# Patient Record
Sex: FEMALE | Race: WHITE | Employment: OTHER | URBAN - METROPOLITAN AREA
[De-identification: names, ages, dates, MRNs, and addresses within clinical notes are randomized per-mention and may not be internally consistent; named-entity substitution may affect disease eponyms.]

---

## 2018-03-20 ENCOUNTER — HOSPITAL ENCOUNTER (OUTPATIENT)
Dept: CT IMAGING | Age: 68
Discharge: HOME | End: 2018-03-20
Admitting: INTERNAL MEDICINE

## 2018-03-20 DIAGNOSIS — N28.89 OTHER SPECIFIED DISORDERS OF KIDNEY AND URETER: ICD-10-CM

## 2018-03-20 DIAGNOSIS — N28.89 RENAL MASS: ICD-10-CM

## 2018-03-20 RX ORDER — TC 99M MEDRONATE 20 MG/10ML
25.6 INJECTION, POWDER, LYOPHILIZED, FOR SOLUTION INTRAVENOUS
Status: COMPLETED | OUTPATIENT
Start: 2018-03-20 | End: 2018-03-20

## 2018-03-20 RX ADMIN — TC 99M MEDRONATE 25.6 MILLICURIE: 20 INJECTION, POWDER, LYOPHILIZED, FOR SOLUTION INTRAVENOUS at 11:05

## 2018-03-29 ENCOUNTER — HOSPITAL ENCOUNTER (OUTPATIENT)
Dept: OTHER | Age: 68
Discharge: HOME | End: 2018-03-29
Attending: UROLOGY | Admitting: UROLOGY

## 2018-03-29 VITALS
DIASTOLIC BLOOD PRESSURE: 82 MMHG | RESPIRATION RATE: 16 BRPM | TEMPERATURE: 98.1 F | OXYGEN SATURATION: 91 % | SYSTOLIC BLOOD PRESSURE: 156 MMHG | HEART RATE: 107 BPM

## 2018-03-29 LAB
A/G RATIO: 0.9 (ref 1.1–2.2)
ABO/RH: NORMAL
ALBUMIN SERPL-MCNC: 3.4 G/DL (ref 3.4–5)
ALP BLD-CCNC: 91 U/L (ref 40–129)
ALT SERPL-CCNC: <5 U/L (ref 10–40)
ANION GAP SERPL CALCULATED.3IONS-SCNC: 13 MMOL/L (ref 3–16)
ANTIBODY SCREEN: NORMAL
AST SERPL-CCNC: 8 U/L (ref 15–37)
BASOPHILS ABSOLUTE: 0 K/UL (ref 0–0.2)
BASOPHILS RELATIVE PERCENT: 0.4 %
BILIRUB SERPL-MCNC: 0.6 MG/DL (ref 0–1)
BILIRUBIN URINE: ABNORMAL
BLOOD, URINE: ABNORMAL
BUN BLDV-MCNC: 12 MG/DL (ref 7–20)
CALCIUM SERPL-MCNC: 9.3 MG/DL (ref 8.3–10.6)
CHLORIDE BLD-SCNC: 98 MMOL/L (ref 99–110)
CLARITY: CLEAR
CO2: 27 MMOL/L (ref 21–32)
COLOR: YELLOW
CREAT SERPL-MCNC: 0.7 MG/DL (ref 0.6–1.2)
EKG ATRIAL RATE: 99 BPM
EKG DIAGNOSIS: NORMAL
EKG P AXIS: 77 DEGREES
EKG P-R INTERVAL: 154 MS
EKG Q-T INTERVAL: 334 MS
EKG QRS DURATION: 88 MS
EKG QTC CALCULATION (BAZETT): 428 MS
EKG R AXIS: 30 DEGREES
EKG T AXIS: 54 DEGREES
EKG VENTRICULAR RATE: 99 BPM
EOSINOPHILS ABSOLUTE: 0.2 K/UL (ref 0–0.6)
EOSINOPHILS RELATIVE PERCENT: 1.8 %
EPITHELIAL CELLS, UA: ABNORMAL /HPF
GFR AFRICAN AMERICAN: >60
GFR NON-AFRICAN AMERICAN: >60
GLOBULIN: 3.7 G/DL
GLUCOSE BLD-MCNC: 182 MG/DL (ref 70–99)
GLUCOSE URINE: NEGATIVE MG/DL
HCT VFR BLD CALC: 35.9 % (ref 36–48)
HEMOGLOBIN: 12 G/DL (ref 12–16)
KETONES, URINE: NEGATIVE MG/DL
LEUKOCYTE ESTERASE, URINE: ABNORMAL
LYMPHOCYTES ABSOLUTE: 1.3 K/UL (ref 1–5.1)
LYMPHOCYTES RELATIVE PERCENT: 12.7 %
MCH RBC QN AUTO: 28.7 PG (ref 26–34)
MCHC RBC AUTO-ENTMCNC: 33.5 G/DL (ref 31–36)
MCV RBC AUTO: 85.7 FL (ref 80–100)
MICROSCOPIC EXAMINATION: YES
MONOCYTES ABSOLUTE: 1 K/UL (ref 0–1.3)
MONOCYTES RELATIVE PERCENT: 9.5 %
MUCUS: ABNORMAL /LPF
NEUTROPHILS ABSOLUTE: 7.6 K/UL (ref 1.7–7.7)
NEUTROPHILS RELATIVE PERCENT: 75.6 %
NITRITE, URINE: NEGATIVE
PDW BLD-RTO: 13.8 % (ref 12.4–15.4)
PH UA: 6
PLATELET # BLD: 291 K/UL (ref 135–450)
PMV BLD AUTO: 8.4 FL (ref 5–10.5)
POTASSIUM SERPL-SCNC: 3.8 MMOL/L (ref 3.5–5.1)
PROTEIN UA: ABNORMAL MG/DL
RBC # BLD: 4.19 M/UL (ref 4–5.2)
RBC UA: ABNORMAL /HPF (ref 0–2)
SODIUM BLD-SCNC: 138 MMOL/L (ref 136–145)
SPECIFIC GRAVITY UA: 1.02
TOTAL PROTEIN: 7.1 G/DL (ref 6.4–8.2)
URINE TYPE: ABNORMAL
UROBILINOGEN, URINE: 4 E.U./DL
WBC # BLD: 10.1 K/UL (ref 4–11)
WBC UA: ABNORMAL /HPF (ref 0–5)

## 2018-03-29 PROCEDURE — 93010 ELECTROCARDIOGRAM REPORT: CPT | Performed by: INTERNAL MEDICINE

## 2018-03-29 RX ORDER — CEFUROXIME AXETIL 250 MG/1
250 TABLET ORAL 2 TIMES DAILY
Qty: 7 TABLET | Refills: 0 | Status: ON HOLD | OUTPATIENT
Start: 2018-03-29 | End: 2018-04-11 | Stop reason: HOSPADM

## 2018-03-29 ASSESSMENT — PAIN SCALES - GENERAL: PAINLEVEL_OUTOF10: 0

## 2018-03-30 LAB — URINE CULTURE, ROUTINE: NORMAL

## 2018-04-05 PROBLEM — N28.89 RIGHT RENAL MASS: Status: ACTIVE | Noted: 2018-04-05

## 2018-04-09 PROBLEM — I10 HTN (HYPERTENSION): Status: ACTIVE | Noted: 2018-04-09

## 2019-02-13 ENCOUNTER — HOSPITAL ENCOUNTER (OUTPATIENT)
Age: 69
Discharge: HOME | End: 2019-02-13
Payer: MEDICARE

## 2019-02-13 ENCOUNTER — HOSPITAL ENCOUNTER (OUTPATIENT)
Dept: GENERAL RADIOLOGY | Age: 69
Discharge: HOME | End: 2019-02-13
Payer: MEDICARE

## 2019-02-13 ENCOUNTER — HOSPITAL ENCOUNTER (OUTPATIENT)
Dept: CT IMAGING | Age: 69
Discharge: HOME | End: 2019-02-13
Payer: MEDICARE

## 2019-02-13 DIAGNOSIS — C64.1 MALIGNANT NEOPLASM OF RIGHT KIDNEY, EXCEPT RENAL PELVIS (HCC): ICD-10-CM

## 2019-02-13 LAB
BUN BLDV-MCNC: 15 MG/DL (ref 7–20)
CREAT SERPL-MCNC: 1 MG/DL (ref 0.6–1.2)
GFR AFRICAN AMERICAN: >60
GFR NON-AFRICAN AMERICAN: 55

## 2019-02-13 PROCEDURE — 71046 X-RAY EXAM CHEST 2 VIEWS: CPT

## 2019-02-13 PROCEDURE — 82565 ASSAY OF CREATININE: CPT

## 2019-02-13 PROCEDURE — 36415 COLL VENOUS BLD VENIPUNCTURE: CPT

## 2019-02-13 PROCEDURE — 84520 ASSAY OF UREA NITROGEN: CPT

## 2019-02-13 PROCEDURE — 6360000004 HC RX CONTRAST MEDICATION: Performed by: UROLOGY

## 2019-02-13 PROCEDURE — 74177 CT ABD & PELVIS W/CONTRAST: CPT

## 2019-02-13 RX ADMIN — IOPAMIDOL 75 ML: 755 INJECTION, SOLUTION INTRAVENOUS at 14:19

## 2019-12-06 ENCOUNTER — HOSPITAL ENCOUNTER (OUTPATIENT)
Dept: OPERATING ROOM | Age: 69
Setting detail: OUTPATIENT SURGERY
Discharge: HOME | End: 2019-12-06
Payer: MEDICARE

## 2019-12-06 VITALS — HEART RATE: 80 BPM | SYSTOLIC BLOOD PRESSURE: 163 MMHG | DIASTOLIC BLOOD PRESSURE: 101 MMHG

## 2019-12-06 PROCEDURE — 6370000000 HC RX 637 (ALT 250 FOR IP): Performed by: OPHTHALMOLOGY

## 2019-12-06 PROCEDURE — 66761 REVISION OF IRIS: CPT

## 2019-12-06 RX ORDER — BRIMONIDINE TARTRATE 2 MG/ML
1 SOLUTION/ DROPS OPHTHALMIC ONCE
Status: COMPLETED | OUTPATIENT
Start: 2019-12-06 | End: 2019-12-06

## 2019-12-06 RX ORDER — PILOCARPINE HYDROCHLORIDE 10 MG/ML
1 SOLUTION/ DROPS OPHTHALMIC 2 TIMES DAILY
Status: COMPLETED | OUTPATIENT
Start: 2019-12-06 | End: 2019-12-06

## 2019-12-06 RX ORDER — PREDNISOLONE ACETATE 10 MG/ML
2 SUSPENSION/ DROPS OPHTHALMIC ONCE
Status: COMPLETED | OUTPATIENT
Start: 2019-12-06 | End: 2019-12-06

## 2019-12-06 RX ORDER — TETRACAINE HYDROCHLORIDE 5 MG/ML
2 SOLUTION OPHTHALMIC ONCE
Status: COMPLETED | OUTPATIENT
Start: 2019-12-06 | End: 2019-12-06

## 2019-12-06 RX ADMIN — TETRACAINE HYDROCHLORIDE 2 DROP: 5 SOLUTION OPHTHALMIC at 13:16

## 2019-12-06 RX ADMIN — PILOCARPINE HYDROCHLORIDE 1 DROP: 10 SOLUTION/ DROPS OPHTHALMIC at 10:33

## 2019-12-06 RX ADMIN — BRIMONIDINE TARTRATE 1 DROP: 2 SOLUTION OPHTHALMIC at 13:16

## 2019-12-06 RX ADMIN — PREDNISOLONE ACETATE 2 DROP: 10 SUSPENSION/ DROPS OPHTHALMIC at 13:19

## 2019-12-06 RX ADMIN — PILOCARPINE HYDROCHLORIDE 1 DROP: 10 SOLUTION/ DROPS OPHTHALMIC at 10:28

## 2019-12-17 RX ORDER — TETRACAINE HYDROCHLORIDE 5 MG/ML
2 SOLUTION OPHTHALMIC ONCE
Status: CANCELLED | OUTPATIENT
Start: 2019-12-20

## 2019-12-17 RX ORDER — PILOCARPINE HYDROCHLORIDE 10 MG/ML
1 SOLUTION/ DROPS OPHTHALMIC 2 TIMES DAILY
Status: CANCELLED | OUTPATIENT
Start: 2019-12-20 | End: 2019-12-21

## 2019-12-17 RX ORDER — BRIMONIDINE TARTRATE 2 MG/ML
1 SOLUTION/ DROPS OPHTHALMIC ONCE
Status: CANCELLED | OUTPATIENT
Start: 2019-12-20

## 2019-12-17 RX ORDER — PREDNISOLONE ACETATE 10 MG/ML
2 SUSPENSION/ DROPS OPHTHALMIC ONCE
Status: CANCELLED | OUTPATIENT
Start: 2019-12-20

## 2019-12-20 ENCOUNTER — HOSPITAL ENCOUNTER (OUTPATIENT)
Dept: OPERATING ROOM | Age: 69
Setting detail: OUTPATIENT SURGERY
Discharge: HOME | End: 2019-12-20
Payer: MEDICARE

## 2020-01-17 ENCOUNTER — HOSPITAL ENCOUNTER (OUTPATIENT)
Dept: OPERATING ROOM | Age: 70
Setting detail: OUTPATIENT SURGERY
Discharge: HOME | End: 2020-01-17
Payer: MEDICARE

## 2020-01-17 VITALS
OXYGEN SATURATION: 94 % | RESPIRATION RATE: 16 BRPM | HEART RATE: 94 BPM | SYSTOLIC BLOOD PRESSURE: 173 MMHG | DIASTOLIC BLOOD PRESSURE: 109 MMHG

## 2020-01-17 PROCEDURE — 6370000000 HC RX 637 (ALT 250 FOR IP): Performed by: OPHTHALMOLOGY

## 2020-01-17 PROCEDURE — 66761 REVISION OF IRIS: CPT

## 2020-01-17 RX ORDER — PREDNISOLONE ACETATE 10 MG/ML
2 SUSPENSION/ DROPS OPHTHALMIC ONCE
Status: COMPLETED | OUTPATIENT
Start: 2020-01-17 | End: 2020-01-17

## 2020-01-17 RX ORDER — BRIMONIDINE TARTRATE 2 MG/ML
1 SOLUTION/ DROPS OPHTHALMIC ONCE
Status: DISCONTINUED | OUTPATIENT
Start: 2020-01-17 | End: 2020-01-18 | Stop reason: HOSPADM

## 2020-01-17 RX ORDER — TETRACAINE HYDROCHLORIDE 5 MG/ML
2 SOLUTION OPHTHALMIC ONCE
Status: DISCONTINUED | OUTPATIENT
Start: 2020-01-17 | End: 2020-01-18 | Stop reason: HOSPADM

## 2020-01-17 RX ORDER — PILOCARPINE HYDROCHLORIDE 10 MG/ML
1 SOLUTION/ DROPS OPHTHALMIC 2 TIMES DAILY
Status: DISCONTINUED | OUTPATIENT
Start: 2020-01-17 | End: 2020-01-18 | Stop reason: HOSPADM

## 2020-01-17 RX ADMIN — PILOCARPINE HYDROCHLORIDE 1 DROP: 10 SOLUTION/ DROPS OPHTHALMIC at 10:39

## 2020-01-17 RX ADMIN — PREDNISOLONE ACETATE 2 DROP: 10 SUSPENSION/ DROPS OPHTHALMIC at 11:01

## 2020-02-05 ENCOUNTER — HOSPITAL ENCOUNTER (OUTPATIENT)
Age: 70
Discharge: HOME | End: 2020-02-05
Payer: MEDICARE

## 2020-02-05 ENCOUNTER — HOSPITAL ENCOUNTER (OUTPATIENT)
Dept: CT IMAGING | Age: 70
Discharge: HOME | End: 2020-02-05
Payer: MEDICARE

## 2020-02-05 ENCOUNTER — HOSPITAL ENCOUNTER (OUTPATIENT)
Dept: GENERAL RADIOLOGY | Age: 70
Discharge: HOME | End: 2020-02-05
Payer: MEDICARE

## 2020-02-05 LAB
BUN BLDV-MCNC: 12 MG/DL (ref 7–20)
CREAT SERPL-MCNC: 1 MG/DL (ref 0.6–1.2)
GFR AFRICAN AMERICAN: >60
GFR NON-AFRICAN AMERICAN: 55

## 2020-02-05 PROCEDURE — 82565 ASSAY OF CREATININE: CPT

## 2020-02-05 PROCEDURE — 36415 COLL VENOUS BLD VENIPUNCTURE: CPT

## 2020-02-05 PROCEDURE — 6360000004 HC RX CONTRAST MEDICATION: Performed by: UROLOGY

## 2020-02-05 PROCEDURE — 71046 X-RAY EXAM CHEST 2 VIEWS: CPT

## 2020-02-05 PROCEDURE — 74177 CT ABD & PELVIS W/CONTRAST: CPT

## 2020-02-05 PROCEDURE — 84520 ASSAY OF UREA NITROGEN: CPT

## 2020-02-05 RX ADMIN — IOPAMIDOL 75 ML: 755 INJECTION, SOLUTION INTRAVENOUS at 14:30

## 2020-02-05 RX ADMIN — IOHEXOL 50 ML: 240 INJECTION, SOLUTION INTRATHECAL; INTRAVASCULAR; INTRAVENOUS; ORAL at 14:30

## 2020-08-11 ENCOUNTER — APPOINTMENT (OUTPATIENT)
Dept: GENERAL RADIOLOGY | Age: 70
End: 2020-08-11
Payer: MEDICARE

## 2020-08-11 ENCOUNTER — HOSPITAL ENCOUNTER (EMERGENCY)
Age: 70
Discharge: HOME | End: 2020-08-11
Attending: EMERGENCY MEDICINE
Payer: MEDICARE

## 2020-08-11 VITALS
SYSTOLIC BLOOD PRESSURE: 162 MMHG | RESPIRATION RATE: 16 BRPM | HEART RATE: 85 BPM | WEIGHT: 140 LBS | TEMPERATURE: 98 F | DIASTOLIC BLOOD PRESSURE: 79 MMHG | OXYGEN SATURATION: 95 % | BODY MASS INDEX: 22.5 KG/M2 | HEIGHT: 66 IN

## 2020-08-11 PROCEDURE — 73130 X-RAY EXAM OF HAND: CPT

## 2020-08-11 PROCEDURE — 99283 EMERGENCY DEPT VISIT LOW MDM: CPT

## 2020-08-11 PROCEDURE — 6370000000 HC RX 637 (ALT 250 FOR IP): Performed by: EMERGENCY MEDICINE

## 2020-08-11 RX ORDER — OXYCODONE HYDROCHLORIDE AND ACETAMINOPHEN 5; 325 MG/1; MG/1
1 TABLET ORAL ONCE
Status: COMPLETED | OUTPATIENT
Start: 2020-08-11 | End: 2020-08-11

## 2020-08-11 RX ADMIN — OXYCODONE HYDROCHLORIDE AND ACETAMINOPHEN 1 TABLET: 5; 325 TABLET ORAL at 20:26

## 2020-08-11 ASSESSMENT — PAIN DESCRIPTION - PAIN TYPE: TYPE: ACUTE PAIN

## 2020-08-11 ASSESSMENT — PAIN DESCRIPTION - LOCATION: LOCATION: HAND

## 2020-08-11 ASSESSMENT — PAIN SCALES - GENERAL
PAINLEVEL_OUTOF10: 8
PAINLEVEL_OUTOF10: 7

## 2020-08-11 ASSESSMENT — PAIN DESCRIPTION - ORIENTATION: ORIENTATION: LEFT

## 2020-08-12 NOTE — ED PROVIDER NOTES
Emergency Department Attending Note    Lazarus Bramble, MD    Date of ED VIsit: 8/11/2020    CHIEF COMPLAINT  Hand Injury (pt states she tripped while chasing her dog last night, c/o L hand pain since then)      HISTORY OF PRESENT ILLNESS  Ruth Owens is a 69 y.o. female  With Vital signs of BP (!) 188/92   Pulse 88   Temp 98 °F (36.7 °C) (Oral)   Resp 18   Ht 5' 6\" (1.676 m)   Wt 140 lb (63.5 kg)   SpO2 92%   BMI 22.60 kg/m²  who presents to the ED with a complaint of left hand pain. Patient seen and evaluated in room 7. Patient's complaining of left hand pain after falling last night while chasing her dog. She has complained primarily on the volar aspect of the hand with swelling. She is able to wiggle her fingers albeit with pain. There is swelling over the knuckles of the second third and fourth knuckles. She has no other sites of injury or complaint of pain. .  No other complaints, modifying factors or associated symptoms. Patients Past medical history reviewed and listed below  Past Medical History:   Diagnosis Date    Allergic rhinitis     Malignant neoplasm of right kidney ([address]     kidney     Past Surgical History:   Procedure Laterality Date    COLONOSCOPY      KIDNEY REMOVAL Right 04/05/2018    DAVINCI RIGHT NEPHRECTOMY                 OTHER SURGICAL HISTORY  late [de-identified]    cone biopsy     TONSILLECTOMY         I have reviewed the following from the nursing documentation. Family History   Problem Relation Age of Onset    High Blood Pressure Mother     Thyroid Disease Mother     Dementia Father      Social History     Socioeconomic History    Marital status:       Spouse name: Not on file    Number of children: Not on file    Years of education: Not on file    Highest education level: Not on file   Occupational History    Not on file   Social Needs    Financial resource strain: Not on file    Food insecurity     Worry: Not on file     Inability: Not on file   [place] Transportation needs     Medical: Not on file     Non-medical: Not on file   Tobacco Use    Smoking status: Current Every Day Smoker     Packs/day: 1.00     Years: 40.00     Pack years: 40.00     Types: Cigarettes     Last attempt to quit: 2018     Years since quittin.3    Smokeless tobacco: Never Used    Tobacco comment: ready to quit   Substance and Sexual Activity    Alcohol use: No    Drug use: No    Sexual activity: Not on file   Lifestyle    Physical activity     Days per week: Not on file     Minutes per session: Not on file    Stress: Not on file   Relationships    Social connections     Talks on phone: Not on file     Gets together: Not on file     Attends Gnosticist service: Not on file     Active member of club or organization: Not on file     Attends meetings of clubs or organizations: Not on file     Relationship status: Not on file    Intimate partner violence     Fear of current or ex partner: Not on file     Emotionally abused: Not on file     Physically abused: Not on file     Forced sexual activity: Not on file   Other Topics Concern    Not on file   Social History Narrative    Not on file     Current Facility-Administered Medications   Medication Dose Route Frequency Provider Last Rate Last Dose    oxyCODONE-acetaminophen (PERCOCET) 5-325 MG per tablet 1 tablet  1 tablet Oral Once Loida Gonzalez MD         No current outpatient medications on file. No Known Allergies    REVIEW OF SYSTEMS  10 systems reviewed, pertinent positives per HPI otherwise noted to be negative     PHYSICAL EXAM  BP (!) 188/92   Pulse 88   Temp 98 °F (36.7 °C) (Oral)   Resp 18   Ht 5' 6\" (1.676 m)   Wt 140 lb (63.5 kg)   SpO2 92%   BMI 22.60 kg/m²   GENERAL APPEARANCE: Awake and alert. Cooperative. In mild distress. HEAD: Normocephalic. Atraumatic. EYES: PERRL. EOM's grossly intact. ENT: Mucous membranes are pink and moist.   NECK: Supple. HEART: RRR. No murmurs.   LUNGS: Respirations unlabored. CTAB. Good air exchange. ABDOMEN: Soft. Non-distended. Non-tender. No masses. No organomegaly. No guarding or rebound. EXTREMITIES: No peripheral edema. Moves all extremities equally. All extremities neurovascularly intact. Swelling noted over the second third and fourth knuckles left hand. She can close that hand albeit with significant pain. Distally she is neurovascularly intact  SKIN: Warm and dry. No acute rashes. NEUROLOGICAL: Alert and oriented. Strength 5/5, sensation intact. Gait normal.   PSYCHIATRIC: Normal mood and affect. No HI or SI expressed to me. RADIOLOGY    If acquired see below     EKG:     If acquired see below       ED COURSE/MDM    Patient has no fracture. It is exquisitely tender and swollen. So she is going to be advised to use ice elevation Tylenol Motrin for pain control. She will also be placed in a volar splint to help with the pain management as well. The ED course and plan were reviewed and results discussed with the patient    The patient understood and agreed with the Discharge/transfer planning.     CLINICAL IMPRESSION and DISPOSITION    Ventura Zepeda was stable and diagnosed with hand contusion    Patient was treated with Percocet and volar splint for pain control     Tere Boo MD  08/11/20 2024

## 2023-09-02 ENCOUNTER — HOSPITAL ENCOUNTER (INPATIENT)
Age: 73
LOS: 5 days | Discharge: HOME HEALTH | DRG: 280 | End: 2023-09-07
Attending: EMERGENCY MEDICINE | Admitting: SURGERY
Payer: MEDICARE

## 2023-09-02 ENCOUNTER — APPOINTMENT (OUTPATIENT)
Dept: GENERAL RADIOLOGY | Age: 73
DRG: 280 | End: 2023-09-02
Payer: MEDICARE

## 2023-09-02 ENCOUNTER — APPOINTMENT (OUTPATIENT)
Dept: CT IMAGING | Age: 73
DRG: 280 | End: 2023-09-02
Payer: MEDICARE

## 2023-09-02 DIAGNOSIS — R53.81 DEBILITY: ICD-10-CM

## 2023-09-02 DIAGNOSIS — J96.21 ACUTE AND CHRONIC RESPIRATORY FAILURE WITH HYPOXIA (HCC): Primary | ICD-10-CM

## 2023-09-02 DIAGNOSIS — J44.1 COPD EXACERBATION (HCC): ICD-10-CM

## 2023-09-02 PROBLEM — R09.02 HYPOXIA: Status: ACTIVE | Noted: 2023-09-02

## 2023-09-02 PROBLEM — J96.02 ACUTE RESPIRATORY FAILURE WITH HYPOXIA AND HYPERCAPNIA (HCC): Status: ACTIVE | Noted: 2023-09-02

## 2023-09-02 PROBLEM — J96.01 ACUTE RESPIRATORY FAILURE WITH HYPOXIA AND HYPERCAPNIA (HCC): Status: ACTIVE | Noted: 2023-09-02

## 2023-09-02 LAB
ALBUMIN SERPL-MCNC: 3.3 G/DL (ref 3.4–5)
ALBUMIN/GLOB SERPL: 0.8 {RATIO} (ref 1.1–2.2)
ALP SERPL-CCNC: 118 U/L (ref 40–129)
ALT SERPL-CCNC: 9 U/L (ref 10–40)
ANION GAP SERPL CALCULATED.3IONS-SCNC: 9 MMOL/L (ref 3–16)
AST SERPL-CCNC: 16 U/L (ref 15–37)
BASE EXCESS BLDV CALC-SCNC: 1.6 MMOL/L (ref -3–3)
BASOPHILS # BLD: 0.1 K/UL (ref 0–0.2)
BASOPHILS NFR BLD: 1 %
BILIRUB SERPL-MCNC: 0.6 MG/DL (ref 0–1)
BUN SERPL-MCNC: 14 MG/DL (ref 7–20)
CALCIUM SERPL-MCNC: 8.6 MG/DL (ref 8.3–10.6)
CHLORIDE SERPL-SCNC: 99 MMOL/L (ref 99–110)
CO2 BLDV-SCNC: 31 MMOL/L
CO2 SERPL-SCNC: 31 MMOL/L (ref 21–32)
COHGB MFR BLDV: 11.4 % (ref 0–1.5)
CREAT SERPL-MCNC: 0.9 MG/DL (ref 0.6–1.2)
DEPRECATED RDW RBC AUTO: 18.6 % (ref 12.4–15.4)
EOSINOPHIL # BLD: 0.2 K/UL (ref 0–0.6)
EOSINOPHIL NFR BLD: 2.8 %
FLUAV RNA RESP QL NAA+PROBE: NOT DETECTED
FLUBV RNA RESP QL NAA+PROBE: NOT DETECTED
GFR SERPLBLD CREATININE-BSD FMLA CKD-EPI: >60 ML/MIN/{1.73_M2}
GLUCOSE SERPL-MCNC: 150 MG/DL (ref 70–99)
HCO3 BLDV-SCNC: 29.4 MMOL/L (ref 23–29)
HCT VFR BLD AUTO: 47.9 % (ref 36–48)
HGB BLD-MCNC: 15.3 G/DL (ref 12–16)
LACTATE BLDV-SCNC: 1.6 MMOL/L (ref 0.4–1.9)
LYMPHOCYTES # BLD: 1.2 K/UL (ref 1–5.1)
LYMPHOCYTES NFR BLD: 14.4 %
MCH RBC QN AUTO: 29.4 PG (ref 26–34)
MCHC RBC AUTO-ENTMCNC: 32 G/DL (ref 31–36)
MCV RBC AUTO: 91.7 FL (ref 80–100)
METHGB MFR BLDV: 0.3 %
MONOCYTES # BLD: 0.7 K/UL (ref 0–1.3)
MONOCYTES NFR BLD: 8.1 %
NEUTROPHILS # BLD: 6.1 K/UL (ref 1.7–7.7)
NEUTROPHILS NFR BLD: 73.7 %
NT-PROBNP SERPL-MCNC: 3459 PG/ML (ref 0–449)
O2 THERAPY: ABNORMAL
PCO2 BLDV: 58.7 MMHG (ref 40–50)
PH BLDV: 7.32 [PH] (ref 7.35–7.45)
PLATELET # BLD AUTO: 253 K/UL (ref 135–450)
PMV BLD AUTO: 7.6 FL (ref 5–10.5)
PO2 BLDV: 36.9 MMHG (ref 25–40)
POTASSIUM SERPL-SCNC: 4.1 MMOL/L (ref 3.5–5.1)
PROT SERPL-MCNC: 7.2 G/DL (ref 6.4–8.2)
RBC # BLD AUTO: 5.22 M/UL (ref 4–5.2)
SAO2 % BLDV: 71 %
SARS-COV-2 RNA RESP QL NAA+PROBE: NOT DETECTED
SODIUM SERPL-SCNC: 139 MMOL/L (ref 136–145)
SPECIMEN STATUS: NORMAL
TROPONIN, HIGH SENSITIVITY: 103 NG/L (ref 0–14)
TROPONIN, HIGH SENSITIVITY: 106 NG/L (ref 0–14)
TROPONIN, HIGH SENSITIVITY: 75 NG/L (ref 0–14)
WBC # BLD AUTO: 8.3 K/UL (ref 4–11)

## 2023-09-02 PROCEDURE — 87636 SARSCOV2 & INF A&B AMP PRB: CPT

## 2023-09-02 PROCEDURE — 6370000000 HC RX 637 (ALT 250 FOR IP): Performed by: NURSE PRACTITIONER

## 2023-09-02 PROCEDURE — 2000000000 HC ICU R&B

## 2023-09-02 PROCEDURE — 83880 ASSAY OF NATRIURETIC PEPTIDE: CPT

## 2023-09-02 PROCEDURE — 99285 EMERGENCY DEPT VISIT HI MDM: CPT

## 2023-09-02 PROCEDURE — 6360000002 HC RX W HCPCS: Performed by: EMERGENCY MEDICINE

## 2023-09-02 PROCEDURE — 80053 COMPREHEN METABOLIC PANEL: CPT

## 2023-09-02 PROCEDURE — 71045 X-RAY EXAM CHEST 1 VIEW: CPT

## 2023-09-02 PROCEDURE — 96374 THER/PROPH/DIAG INJ IV PUSH: CPT

## 2023-09-02 PROCEDURE — 82803 BLOOD GASES ANY COMBINATION: CPT

## 2023-09-02 PROCEDURE — 6360000002 HC RX W HCPCS

## 2023-09-02 PROCEDURE — 83605 ASSAY OF LACTIC ACID: CPT

## 2023-09-02 PROCEDURE — 94761 N-INVAS EAR/PLS OXIMETRY MLT: CPT

## 2023-09-02 PROCEDURE — 6360000002 HC RX W HCPCS: Performed by: NURSE PRACTITIONER

## 2023-09-02 PROCEDURE — 2580000003 HC RX 258: Performed by: EMERGENCY MEDICINE

## 2023-09-02 PROCEDURE — 71260 CT THORAX DX C+: CPT

## 2023-09-02 PROCEDURE — 87040 BLOOD CULTURE FOR BACTERIA: CPT

## 2023-09-02 PROCEDURE — 93005 ELECTROCARDIOGRAM TRACING: CPT | Performed by: EMERGENCY MEDICINE

## 2023-09-02 PROCEDURE — 84484 ASSAY OF TROPONIN QUANT: CPT

## 2023-09-02 PROCEDURE — 6370000000 HC RX 637 (ALT 250 FOR IP): Performed by: EMERGENCY MEDICINE

## 2023-09-02 PROCEDURE — 6360000004 HC RX CONTRAST MEDICATION: Performed by: EMERGENCY MEDICINE

## 2023-09-02 PROCEDURE — 2700000000 HC OXYGEN THERAPY PER DAY

## 2023-09-02 PROCEDURE — 85025 COMPLETE CBC W/AUTO DIFF WBC: CPT

## 2023-09-02 RX ORDER — ONDANSETRON 4 MG/1
4 TABLET, ORALLY DISINTEGRATING ORAL EVERY 8 HOURS PRN
Status: DISCONTINUED | OUTPATIENT
Start: 2023-09-02 | End: 2023-09-07 | Stop reason: HOSPADM

## 2023-09-02 RX ORDER — METHYLPREDNISOLONE SODIUM SUCCINATE 125 MG/2ML
125 INJECTION, POWDER, LYOPHILIZED, FOR SOLUTION INTRAMUSCULAR; INTRAVENOUS ONCE
Status: COMPLETED | OUTPATIENT
Start: 2023-09-02 | End: 2023-09-02

## 2023-09-02 RX ORDER — LOSARTAN POTASSIUM 25 MG/1
25 TABLET ORAL DAILY
Status: DISCONTINUED | OUTPATIENT
Start: 2023-09-03 | End: 2023-09-03

## 2023-09-02 RX ORDER — NITROGLYCERIN 20 MG/100ML
5-200 INJECTION INTRAVENOUS CONTINUOUS
Status: DISCONTINUED | OUTPATIENT
Start: 2023-09-02 | End: 2023-09-03

## 2023-09-02 RX ORDER — NICOTINE 21 MG/24HR
1 PATCH, TRANSDERMAL 24 HOURS TRANSDERMAL NIGHTLY
Status: DISCONTINUED | OUTPATIENT
Start: 2023-09-02 | End: 2023-09-07 | Stop reason: HOSPADM

## 2023-09-02 RX ORDER — HYDROCHLOROTHIAZIDE 25 MG/1
12.5 TABLET ORAL DAILY
Status: DISCONTINUED | OUTPATIENT
Start: 2023-09-03 | End: 2023-09-03

## 2023-09-02 RX ORDER — ONDANSETRON 2 MG/ML
4 INJECTION INTRAMUSCULAR; INTRAVENOUS EVERY 6 HOURS PRN
Status: DISCONTINUED | OUTPATIENT
Start: 2023-09-02 | End: 2023-09-07 | Stop reason: HOSPADM

## 2023-09-02 RX ORDER — ONDANSETRON 2 MG/ML
INJECTION INTRAMUSCULAR; INTRAVENOUS
Status: COMPLETED
Start: 2023-09-02 | End: 2023-09-02

## 2023-09-02 RX ORDER — IPRATROPIUM BROMIDE AND ALBUTEROL SULFATE 2.5; .5 MG/3ML; MG/3ML
1 SOLUTION RESPIRATORY (INHALATION) ONCE
Status: COMPLETED | OUTPATIENT
Start: 2023-09-02 | End: 2023-09-02

## 2023-09-02 RX ORDER — NICOTINE 21 MG/24HR
1 PATCH, TRANSDERMAL 24 HOURS TRANSDERMAL DAILY
Status: DISCONTINUED | OUTPATIENT
Start: 2023-09-03 | End: 2023-09-02

## 2023-09-02 RX ORDER — FUROSEMIDE 10 MG/ML
20 INJECTION INTRAMUSCULAR; INTRAVENOUS ONCE
Status: COMPLETED | OUTPATIENT
Start: 2023-09-02 | End: 2023-09-02

## 2023-09-02 RX ADMIN — ONDANSETRON 4 MG: 2 INJECTION INTRAMUSCULAR; INTRAVENOUS at 22:16

## 2023-09-02 RX ADMIN — NITROGLYCERIN 5 MCG/MIN: 20 INJECTION INTRAVENOUS at 23:09

## 2023-09-02 RX ADMIN — FUROSEMIDE 20 MG: 10 INJECTION, SOLUTION INTRAMUSCULAR; INTRAVENOUS at 22:12

## 2023-09-02 RX ADMIN — AZITHROMYCIN MONOHYDRATE 500 MG: 500 INJECTION, POWDER, LYOPHILIZED, FOR SOLUTION INTRAVENOUS at 22:01

## 2023-09-02 RX ADMIN — IOPAMIDOL 75 ML: 755 INJECTION, SOLUTION INTRAVENOUS at 20:00

## 2023-09-02 RX ADMIN — METHYLPREDNISOLONE SODIUM SUCCINATE 125 MG: 125 INJECTION INTRAMUSCULAR; INTRAVENOUS at 20:06

## 2023-09-02 RX ADMIN — IPRATROPIUM BROMIDE AND ALBUTEROL SULFATE 1 DOSE: .5; 3 SOLUTION RESPIRATORY (INHALATION) at 20:06

## 2023-09-02 RX ADMIN — ALBUTEROL SULFATE 7.5 MG: 2.5 SOLUTION RESPIRATORY (INHALATION) at 21:58

## 2023-09-02 ASSESSMENT — PAIN - FUNCTIONAL ASSESSMENT: PAIN_FUNCTIONAL_ASSESSMENT: NONE - DENIES PAIN

## 2023-09-02 NOTE — ED NOTES
Pt found at 57% on RA. Placed on non rebreather at 15L. Took several minutes for SPO2 >90%. Weaned to 12L non rebreather. Weaned to 10L high flow, humidified. Currently 96% on 10L.       Jessica Ortega RN  09/02/23 6650

## 2023-09-03 PROBLEM — R79.89 ELEVATED BRAIN NATRIURETIC PEPTIDE (BNP) LEVEL: Status: ACTIVE | Noted: 2023-09-03

## 2023-09-03 PROBLEM — I16.1 HYPERTENSIVE EMERGENCY: Status: ACTIVE | Noted: 2023-09-03

## 2023-09-03 PROBLEM — Z87.891 SMOKING HISTORY: Status: ACTIVE | Noted: 2023-09-03

## 2023-09-03 PROBLEM — I50.31 ACUTE DIASTOLIC HEART FAILURE (HCC): Status: ACTIVE | Noted: 2023-09-03

## 2023-09-03 PROBLEM — R65.10 SIRS (SYSTEMIC INFLAMMATORY RESPONSE SYNDROME) (HCC): Status: ACTIVE | Noted: 2023-09-03

## 2023-09-03 PROBLEM — J44.9 COPD (CHRONIC OBSTRUCTIVE PULMONARY DISEASE) (HCC): Status: ACTIVE | Noted: 2023-09-03

## 2023-09-03 PROBLEM — I21.4 NSTEMI (NON-ST ELEVATED MYOCARDIAL INFARCTION) (HCC): Status: ACTIVE | Noted: 2023-09-03

## 2023-09-03 PROBLEM — R91.8 MASS OF UPPER LOBE OF RIGHT LUNG: Status: ACTIVE | Noted: 2023-09-03

## 2023-09-03 LAB
ANION GAP SERPL CALCULATED.3IONS-SCNC: 5 MMOL/L (ref 3–16)
BASE EXCESS BLDV CALC-SCNC: 5.6 MMOL/L (ref -3–3)
BASOPHILS # BLD: 0 K/UL (ref 0–0.2)
BASOPHILS NFR BLD: 0.3 %
BUN SERPL-MCNC: 12 MG/DL (ref 7–20)
CALCIUM SERPL-MCNC: 8.1 MG/DL (ref 8.3–10.6)
CHLORIDE SERPL-SCNC: 101 MMOL/L (ref 99–110)
CHOLEST SERPL-MCNC: 112 MG/DL (ref 0–199)
CO2 BLDV-SCNC: 34 MMOL/L
CO2 SERPL-SCNC: 31 MMOL/L (ref 21–32)
COHGB MFR BLDV: 4.6 % (ref 0–1.5)
CREAT SERPL-MCNC: 0.8 MG/DL (ref 0.6–1.2)
DEPRECATED RDW RBC AUTO: 18.6 % (ref 12.4–15.4)
EKG ATRIAL RATE: 94 BPM
EKG DIAGNOSIS: NORMAL
EKG P AXIS: 53 DEGREES
EKG P-R INTERVAL: 158 MS
EKG Q-T INTERVAL: 370 MS
EKG QRS DURATION: 98 MS
EKG QTC CALCULATION (BAZETT): 462 MS
EKG R AXIS: 48 DEGREES
EKG T AXIS: -12 DEGREES
EKG VENTRICULAR RATE: 94 BPM
EOSINOPHIL # BLD: 0 K/UL (ref 0–0.6)
EOSINOPHIL NFR BLD: 0.1 %
GFR SERPLBLD CREATININE-BSD FMLA CKD-EPI: >60 ML/MIN/{1.73_M2}
GLUCOSE SERPL-MCNC: 130 MG/DL (ref 70–99)
HCO3 BLDV-SCNC: 32.5 MMOL/L (ref 23–29)
HCT VFR BLD AUTO: 44.4 % (ref 36–48)
HDLC SERPL-MCNC: 31 MG/DL (ref 40–60)
HGB BLD-MCNC: 14.1 G/DL (ref 12–16)
LDLC SERPL CALC-MCNC: 72 MG/DL
LV EF: 55 %
LVEF MODALITY: NORMAL
LYMPHOCYTES # BLD: 0.3 K/UL (ref 1–5.1)
LYMPHOCYTES NFR BLD: 4.2 %
MAGNESIUM SERPL-MCNC: 2.1 MG/DL (ref 1.8–2.4)
MCH RBC QN AUTO: 29.1 PG (ref 26–34)
MCHC RBC AUTO-ENTMCNC: 31.7 G/DL (ref 31–36)
MCV RBC AUTO: 91.9 FL (ref 80–100)
METHGB MFR BLDV: 0.3 %
MONOCYTES # BLD: 0.1 K/UL (ref 0–1.3)
MONOCYTES NFR BLD: 1.4 %
NEUTROPHILS # BLD: 6.7 K/UL (ref 1.7–7.7)
NEUTROPHILS NFR BLD: 94 %
O2 THERAPY: ABNORMAL
PCO2 BLDV: 56.8 MMHG (ref 40–50)
PH BLDV: 7.38 [PH] (ref 7.35–7.45)
PLATELET # BLD AUTO: 168 K/UL (ref 135–450)
PMV BLD AUTO: 8 FL (ref 5–10.5)
PO2 BLDV: 56.4 MMHG (ref 25–40)
POTASSIUM SERPL-SCNC: 4.3 MMOL/L (ref 3.5–5.1)
PROCALCITONIN SERPL IA-MCNC: 0.06 NG/ML (ref 0–0.15)
RBC # BLD AUTO: 4.83 M/UL (ref 4–5.2)
SAO2 % BLDV: 90 %
SODIUM SERPL-SCNC: 137 MMOL/L (ref 136–145)
TRIGL SERPL-MCNC: 47 MG/DL (ref 0–150)
VLDLC SERPL CALC-MCNC: 9 MG/DL
WBC # BLD AUTO: 7.2 K/UL (ref 4–11)

## 2023-09-03 PROCEDURE — 36415 COLL VENOUS BLD VENIPUNCTURE: CPT

## 2023-09-03 PROCEDURE — 6370000000 HC RX 637 (ALT 250 FOR IP): Performed by: NURSE PRACTITIONER

## 2023-09-03 PROCEDURE — 93010 ELECTROCARDIOGRAM REPORT: CPT | Performed by: INTERNAL MEDICINE

## 2023-09-03 PROCEDURE — 2700000000 HC OXYGEN THERAPY PER DAY

## 2023-09-03 PROCEDURE — 6360000002 HC RX W HCPCS: Performed by: NURSE PRACTITIONER

## 2023-09-03 PROCEDURE — 83036 HEMOGLOBIN GLYCOSYLATED A1C: CPT

## 2023-09-03 PROCEDURE — 6370000000 HC RX 637 (ALT 250 FOR IP): Performed by: HOSPITALIST

## 2023-09-03 PROCEDURE — 99223 1ST HOSP IP/OBS HIGH 75: CPT | Performed by: INTERNAL MEDICINE

## 2023-09-03 PROCEDURE — 80061 LIPID PANEL: CPT

## 2023-09-03 PROCEDURE — 82803 BLOOD GASES ANY COMBINATION: CPT

## 2023-09-03 PROCEDURE — 94640 AIRWAY INHALATION TREATMENT: CPT

## 2023-09-03 PROCEDURE — 80048 BASIC METABOLIC PNL TOTAL CA: CPT

## 2023-09-03 PROCEDURE — 83735 ASSAY OF MAGNESIUM: CPT

## 2023-09-03 PROCEDURE — 6360000002 HC RX W HCPCS: Performed by: INTERNAL MEDICINE

## 2023-09-03 PROCEDURE — 6370000000 HC RX 637 (ALT 250 FOR IP): Performed by: INTERNAL MEDICINE

## 2023-09-03 PROCEDURE — 2580000003 HC RX 258: Performed by: NURSE PRACTITIONER

## 2023-09-03 PROCEDURE — 1200000000 HC SEMI PRIVATE

## 2023-09-03 PROCEDURE — 93005 ELECTROCARDIOGRAM TRACING: CPT | Performed by: NURSE PRACTITIONER

## 2023-09-03 PROCEDURE — 94761 N-INVAS EAR/PLS OXIMETRY MLT: CPT

## 2023-09-03 PROCEDURE — 85025 COMPLETE CBC W/AUTO DIFF WBC: CPT

## 2023-09-03 PROCEDURE — 93306 TTE W/DOPPLER COMPLETE: CPT

## 2023-09-03 PROCEDURE — 84145 PROCALCITONIN (PCT): CPT

## 2023-09-03 RX ORDER — ACETAMINOPHEN 650 MG/1
650 SUPPOSITORY RECTAL EVERY 6 HOURS PRN
Status: DISCONTINUED | OUTPATIENT
Start: 2023-09-03 | End: 2023-09-07 | Stop reason: HOSPADM

## 2023-09-03 RX ORDER — REGADENOSON 0.08 MG/ML
0.4 INJECTION, SOLUTION INTRAVENOUS
Status: DISCONTINUED | OUTPATIENT
Start: 2023-09-03 | End: 2023-09-07 | Stop reason: HOSPADM

## 2023-09-03 RX ORDER — SODIUM CHLORIDE 0.9 % (FLUSH) 0.9 %
5-40 SYRINGE (ML) INJECTION PRN
Status: DISCONTINUED | OUTPATIENT
Start: 2023-09-03 | End: 2023-09-07 | Stop reason: HOSPADM

## 2023-09-03 RX ORDER — ASPIRIN 325 MG
325 TABLET, DELAYED RELEASE (ENTERIC COATED) ORAL DAILY
Status: DISCONTINUED | OUTPATIENT
Start: 2023-09-03 | End: 2023-09-07 | Stop reason: HOSPADM

## 2023-09-03 RX ORDER — ACETAMINOPHEN 325 MG/1
650 TABLET ORAL EVERY 6 HOURS PRN
Status: DISCONTINUED | OUTPATIENT
Start: 2023-09-03 | End: 2023-09-07 | Stop reason: HOSPADM

## 2023-09-03 RX ORDER — ENOXAPARIN SODIUM 100 MG/ML
40 INJECTION SUBCUTANEOUS DAILY
Status: DISCONTINUED | OUTPATIENT
Start: 2023-09-03 | End: 2023-09-03

## 2023-09-03 RX ORDER — SODIUM CHLORIDE 0.9 % (FLUSH) 0.9 %
5-40 SYRINGE (ML) INJECTION EVERY 12 HOURS SCHEDULED
Status: DISCONTINUED | OUTPATIENT
Start: 2023-09-03 | End: 2023-09-07 | Stop reason: HOSPADM

## 2023-09-03 RX ORDER — LOSARTAN POTASSIUM 25 MG/1
50 TABLET ORAL DAILY
Status: DISCONTINUED | OUTPATIENT
Start: 2023-09-04 | End: 2023-09-07 | Stop reason: HOSPADM

## 2023-09-03 RX ORDER — POLYETHYLENE GLYCOL 3350 17 G/17G
17 POWDER, FOR SOLUTION ORAL DAILY PRN
Status: DISCONTINUED | OUTPATIENT
Start: 2023-09-03 | End: 2023-09-07

## 2023-09-03 RX ORDER — DEXTROSE MONOHYDRATE 100 MG/ML
INJECTION, SOLUTION INTRAVENOUS CONTINUOUS PRN
Status: DISCONTINUED | OUTPATIENT
Start: 2023-09-03 | End: 2023-09-03

## 2023-09-03 RX ORDER — PANTOPRAZOLE SODIUM 40 MG/1
40 TABLET, DELAYED RELEASE ORAL
Status: DISCONTINUED | OUTPATIENT
Start: 2023-09-03 | End: 2023-09-07 | Stop reason: HOSPADM

## 2023-09-03 RX ORDER — ALBUTEROL SULFATE 2.5 MG/3ML
2.5 SOLUTION RESPIRATORY (INHALATION)
Status: DISCONTINUED | OUTPATIENT
Start: 2023-09-03 | End: 2023-09-03

## 2023-09-03 RX ORDER — ATORVASTATIN CALCIUM 40 MG/1
40 TABLET, FILM COATED ORAL NIGHTLY
Status: DISCONTINUED | OUTPATIENT
Start: 2023-09-03 | End: 2023-09-07 | Stop reason: HOSPADM

## 2023-09-03 RX ORDER — SODIUM CHLORIDE 9 MG/ML
INJECTION, SOLUTION INTRAVENOUS PRN
Status: DISCONTINUED | OUTPATIENT
Start: 2023-09-03 | End: 2023-09-07 | Stop reason: HOSPADM

## 2023-09-03 RX ORDER — FUROSEMIDE 10 MG/ML
40 INJECTION INTRAMUSCULAR; INTRAVENOUS 2 TIMES DAILY
Status: DISCONTINUED | OUTPATIENT
Start: 2023-09-03 | End: 2023-09-04

## 2023-09-03 RX ORDER — ENOXAPARIN SODIUM 100 MG/ML
1 INJECTION SUBCUTANEOUS 2 TIMES DAILY
Status: DISCONTINUED | OUTPATIENT
Start: 2023-09-03 | End: 2023-09-07

## 2023-09-03 RX ORDER — FUROSEMIDE 10 MG/ML
60 INJECTION INTRAMUSCULAR; INTRAVENOUS ONCE
Status: COMPLETED | OUTPATIENT
Start: 2023-09-03 | End: 2023-09-03

## 2023-09-03 RX ORDER — AMLODIPINE BESYLATE 5 MG/1
5 TABLET ORAL DAILY
Status: DISCONTINUED | OUTPATIENT
Start: 2023-09-03 | End: 2023-09-07 | Stop reason: HOSPADM

## 2023-09-03 RX ADMIN — NITROGLYCERIN 0.5 INCH: 20 OINTMENT TOPICAL at 18:24

## 2023-09-03 RX ADMIN — PREDNISONE 30 MG: 20 TABLET ORAL at 11:35

## 2023-09-03 RX ADMIN — LOSARTAN POTASSIUM 25 MG: 25 TABLET, FILM COATED ORAL at 10:35

## 2023-09-03 RX ADMIN — NITROGLYCERIN 0.5 INCH: 20 OINTMENT TOPICAL at 10:34

## 2023-09-03 RX ADMIN — HYDROCHLOROTHIAZIDE 12.5 MG: 25 TABLET ORAL at 10:33

## 2023-09-03 RX ADMIN — PANTOPRAZOLE SODIUM 40 MG: 40 TABLET, DELAYED RELEASE ORAL at 10:34

## 2023-09-03 RX ADMIN — FUROSEMIDE 60 MG: 10 INJECTION, SOLUTION INTRAMUSCULAR; INTRAVENOUS at 11:36

## 2023-09-03 RX ADMIN — ENOXAPARIN SODIUM 40 MG: 100 INJECTION SUBCUTANEOUS at 10:33

## 2023-09-03 RX ADMIN — ALBUTEROL SULFATE 2.5 MG: 2.5 SOLUTION RESPIRATORY (INHALATION) at 08:05

## 2023-09-03 RX ADMIN — SODIUM CHLORIDE, PRESERVATIVE FREE 10 ML: 5 INJECTION INTRAVENOUS at 20:21

## 2023-09-03 RX ADMIN — FUROSEMIDE 40 MG: 10 INJECTION, SOLUTION INTRAMUSCULAR; INTRAVENOUS at 18:47

## 2023-09-03 RX ADMIN — METHYLPREDNISOLONE SODIUM SUCCINATE 40 MG: 40 INJECTION, POWDER, FOR SOLUTION INTRAMUSCULAR; INTRAVENOUS at 10:34

## 2023-09-03 RX ADMIN — METOPROLOL TARTRATE 25 MG: 25 TABLET, FILM COATED ORAL at 11:35

## 2023-09-03 RX ADMIN — AMLODIPINE BESYLATE 5 MG: 5 TABLET ORAL at 16:00

## 2023-09-03 RX ADMIN — ASPIRIN 325 MG: 325 TABLET, COATED ORAL at 11:35

## 2023-09-03 RX ADMIN — METOPROLOL TARTRATE 25 MG: 25 TABLET, FILM COATED ORAL at 20:20

## 2023-09-03 RX ADMIN — SODIUM CHLORIDE, PRESERVATIVE FREE 10 ML: 5 INJECTION INTRAVENOUS at 10:35

## 2023-09-03 RX ADMIN — ATORVASTATIN CALCIUM 40 MG: 40 TABLET, FILM COATED ORAL at 20:20

## 2023-09-03 ASSESSMENT — PAIN - FUNCTIONAL ASSESSMENT: PAIN_FUNCTIONAL_ASSESSMENT: ACTIVITIES ARE NOT PREVENTED

## 2023-09-03 ASSESSMENT — PAIN SCALES - GENERAL
PAINLEVEL_OUTOF10: 0
PAINLEVEL_OUTOF10: 2
PAINLEVEL_OUTOF10: 0

## 2023-09-03 ASSESSMENT — PAIN DESCRIPTION - DESCRIPTORS: DESCRIPTORS: ACHING

## 2023-09-03 ASSESSMENT — PAIN DESCRIPTION - LOCATION: LOCATION: NECK

## 2023-09-03 ASSESSMENT — PAIN DESCRIPTION - PAIN TYPE: TYPE: ACUTE PAIN

## 2023-09-03 ASSESSMENT — PAIN DESCRIPTION - ONSET: ONSET: GRADUAL

## 2023-09-03 ASSESSMENT — PAIN DESCRIPTION - ORIENTATION: ORIENTATION: UPPER

## 2023-09-03 ASSESSMENT — PAIN DESCRIPTION - FREQUENCY: FREQUENCY: CONTINUOUS

## 2023-09-03 NOTE — PROGRESS NOTES
Patient transferred out of ICU to B3, room 366. Report given to SHAWN Quinteros. Patient left in good condition with all personal belongings. Transported by RN to new room.     Jeannine Rosado RN

## 2023-09-03 NOTE — PROGRESS NOTES
Patient transferred out of ICU to B3, room 366. Report given to [facility] MARTIN BALDWIN RN. Patient left in good condition with all personal belongings. Transported by RN to new room.      Ashley Gilbert RN

## 2023-09-03 NOTE — ED PROVIDER NOTES
Emergency Physician Note  [facility]  ED    Pt Name: Robinson Ruth  MRN: [id number]  [address] [place] 1950  Date of evaluation: 9/2/2023  Provider: Fabiana Morillo MD  PCP: No primary care provider on file. Note Open Time: 9:12 PM EDT 9/2/23    Chief Complaint  Fatigue (Patient states she has not been feeling well for the last week or two and having trouble moving around. Daughter called 45 today and they told her she was on the verge of having a stroke and wanted her to go to the ED but she refused. She is complaining of right eye swelling, but denying any headache, dizziness, lightheadedness. )       History of Present Illness  Robinson Ruth is a 75 y.o. female who presents to the ED for shortness of breath. Patient reports that she is short of breath but that she is here because family wants her to be here. She was apparently in bed and was too weak to get out to get around so they came to the ER. Patient has had general decline over the last 1 to 2 weeks. Patient denies any fevers. History from : Patient and Family      Limitations to history : None    REVIEW OF SYSTEMS :      Review of Systems    Positives and Pertinent negatives as per HPI.      Medications/allergies/medical/social/family history  I have reviewed the following from the nursing documentation:      Prior to Admission medications    Not on File       Allergies as of 09/02/2023    (No Known Allergies)       Past Medical History:   Diagnosis Date    Allergic rhinitis     Malignant neoplasm of right kidney [facility])     kidney        Surgical History:   Past Surgical History:   Procedure Laterality Date    COLONOSCOPY      KIDNEY REMOVAL Right 04/05/2018    DAVINCI RIGHT NEPHRECTOMY                 OTHER SURGICAL HISTORY  late 80s    cone biopsy     TONSILLECTOMY          Family History:    Family History   Problem Relation Age of Onset    High Blood Pressure Mother     Thyroid Disease Mother     Dementia Father

## 2023-09-03 NOTE — CONSULTS
INPATIENT PULMONARY CRITICAL CARE CONSULT NOTE      Chief Complaint/Referring Provider:  Patient is being seen at the request of Dimitry JACOBO for a consultation for probable COPD, now with acute respite failure with hypercapnia requiring high flow nasal cannula     Presenting HPI: Patient was brought to the hospital by family because of shortness of breath at home    As per the admitting provider-Nany Haile is a/an 75 y.o. female with no significant past medical history who presents to [facility] emergency for acute dyspnea at home. She reports family thought she was turning blue but she adamantly denies feeling any symptoms of dyspnea, chest pain, or recent chest congestion. She self-discloses she has not routinely had medical care in quite some time, but discloses she has a known lung nodule. On arrival here, she was exquisitely hypoxic in the 40s, but responded on HFNC 15L/min to 88-90%. Her evaluation here included laboratory studies, EKG, and CTPA study. CT showed no evidence of acute PE, but small right pleural effusion. CXR showed right hilar mass, vascular congestion noted as well. Pertinent laboratory studies include normal chemistry panel, , elevated BNP at 3,459, troponin trend of 103, 106, and 75, respectively. Cell count showed normal WBC at 8.3, hemoglobin 15.3, hematocrit 47, and platelets 901. Flu and COVID testing were both negative. VBG showed a pH of 7.31, PCO2 58, pO2 36, and HCO3 29. She received azithromycin 500mg IVPB, Lasix 20mg IVP x 1, Solu-Medrol 125mg IVP x 1, and an extended albuterol treatment. She was also noted to have markedly elevated BP trend, SBP max 180, which nitroglycerin infusion was initiated. Hospital team was consulted to admit this patient for acute hypoxic respiratory failure.   Patient was seen this morning was still on high flow oxygen at 8 L/min with saturation of 91% when seen, patient was getting nitroglycerin infusion for concerning for mass or adenopathy. 2. Right basilar consolidation/pleural effusion, pneumonia is a consideration. 3.  Congestive heart failure is most likely given the radiographic findings;   pneumonia is also a consideration in areas of consolidation with pleural   effusion. 4.  Pulmonary sequela typical of that seen with smoking, including COPD. 5. Calcific atherosclerosis aorta. 6. Cardiomegaly. RECOMMENDATION:   IV contrast-enhanced chest CT           XR CHEST PORTABLE    Result Date: 9/2/2023  EXAMINATION: ONE XRAY VIEW OF THE CHEST 9/2/2023 6:18 pm COMPARISON: Chest 02/05/2020 HISTORY: SOB and tired FINDINGS: The cardiac silhouette is enlarged. Calcifications involving the aorta reflect atherosclerosis. The mediastinal and LEFT hilar silhouettes appear unremarkable. Prominent soft tissue fullness at the right hilum. Nonspecific right basilar opacity obscures the right hemidiaphragm. Blunting right lateral costophrenic sulcus. Vascular engorgement and cephalization is demonstrated with bilateral peribronchial cuffing and perivascular haziness. Chronic appearing coarse interstitial densities predominate perihilar regions and lung bases, typical of sequela from smoking or other previous infectious/inflammatory process. The lungs are hyperinflated with increased translucency both lung apices and tapering of the vasculature in the upper lungs. No pneumothorax is seen. No acute osseous abnormality is identified. 1. Right hilar soft tissue fullness concerning for mass or adenopathy. 2. Right basilar consolidation/pleural effusion, pneumonia is a consideration. 3.  Congestive heart failure is most likely given the radiographic findings; pneumonia is also a consideration in areas of consolidation with pleural effusion. 4.  Pulmonary sequela typical of that seen with smoking, including COPD. 5. Calcific atherosclerosis aorta. 6. Cardiomegaly.  RECOMMENDATION: IV contrast-enhanced chest CT     CT

## 2023-09-03 NOTE — PROGRESS NOTES
BP: (!) 153/80 113/65     Pulse: 89 88 93 90   Resp: 21  22 18   Temp:       TempSrc:       SpO2: (!) 89%  95% 95%   Weight:       Height:             Physical Exam:      General: awake, alert, in NAD  Eyes: EOMI  ENT: neck supple  Cardiovascular: Regular rate. Respiratory: Clear to auscultation  Gastrointestinal: Soft, non tender, BS+  Genitourinary: no suprapubic tenderness  Musculoskeletal: No edema  Skin: warm, dry  Neuro: Alert alert, Cranial nerves intack, no focal motor or sensory deficits. Psych: Mood appropriate. Medications:   Medications:    sodium chloride flush  5-40 mL IntraVENous 2 times per day    pantoprazole  40 mg Oral QAM AC    nitroglycerin  0.5 inch Topical 4 times per day    tiotropium-olodaterol  2 puff Inhalation Daily    predniSONE  30 mg Oral Daily    enoxaparin  1 mg/kg SubCUTAneous BID    metoprolol tartrate  25 mg Oral BID    aspirin  325 mg Oral Daily    atorvastatin  40 mg Oral Nightly    furosemide  40 mg IntraVENous BID    losartan  25 mg Oral Daily    nicotine  1 patch TransDERmal Nightly      Infusions:    sodium chloride       PRN Meds: sodium chloride flush, 5-40 mL, PRN  sodium chloride, , PRN  polyethylene glycol, 17 g, Daily PRN  acetaminophen, 650 mg, Q6H PRN   Or  acetaminophen, 650 mg, Q6H PRN  perflutren lipid microspheres, 1.5 mL, ONCE PRN  regadenoson, 0.4 mg, ONCE PRN  ondansetron, 4 mg, Q8H PRN   Or  ondansetron, 4 mg, Q6H PRN        Labs and Imaging   XR CHEST PORTABLE    Result Date: 9/2/2023  EXAMINATION: ONE XRAY VIEW OF THE CHEST 9/2/2023 6:18 pm COMPARISON: Chest 02/05/2020 HISTORY: SOB and tired FINDINGS: The cardiac silhouette is enlarged. Calcifications involving the aorta reflect atherosclerosis. The mediastinal and LEFT hilar silhouettes appear unremarkable. Prominent soft tissue fullness at the right hilum. Nonspecific right basilar opacity obscures the right hemidiaphragm. Blunting right lateral costophrenic sulcus.   Vascular engorgement and cephalization is demonstrated with bilateral peribronchial cuffing and perivascular haziness. Chronic appearing coarse interstitial densities predominate perihilar regions and lung bases, typical of sequela from smoking or other previous infectious/inflammatory process. The lungs are hyperinflated with increased translucency both lung apices and tapering of the vasculature in the upper lungs. No pneumothorax is seen. No acute osseous abnormality is identified. 1. Right hilar soft tissue fullness concerning for mass or adenopathy. 2. Right basilar consolidation/pleural effusion, pneumonia is a consideration. 3.  Congestive heart failure is most likely given the radiographic findings; pneumonia is also a consideration in areas of consolidation with pleural effusion. 4.  Pulmonary sequela typical of that seen with smoking, including COPD. 5. Calcific atherosclerosis aorta. 6. Cardiomegaly. RECOMMENDATION: IV contrast-enhanced chest CT     CT CHEST PULMONARY EMBOLISM W CONTRAST    Result Date: 9/2/2023  EXAMINATION: CTA OF THE CHEST 9/2/2023 8:01 pm TECHNIQUE: CTA of the chest was performed after the administration of intravenous contrast.  Multiplanar reformatted images are provided for review. MIP images are provided for review. Automated exposure control, iterative reconstruction, and/or weight based adjustment of the mA/kV was utilized to reduce the radiation dose to as low as reasonably achievable. COMPARISON: 03/20/2018 HISTORY: ORDERING SYSTEM PROVIDED HISTORY: SOB, eval for PE TECHNOLOGIST PROVIDED HISTORY: Reason for exam:->SOB, eval for PE Decision Support Exception - unselect if not a suspected or confirmed emergency medical condition->Emergency Medical Condition (MA) Reason for Exam: weakness and sob FINDINGS: Pulmonary Arteries: Pulmonary arteries are adequately opacified for evaluation. No evidence of intraluminal filling defect to suggest pulmonary embolism.   Contrast mixing artifact in the

## 2023-09-03 NOTE — PROGRESS NOTES
Ms. Mara Hui admitted to C2 Room 51 [phone] from the ED. She arrives on 10L nasal cannula, on 5 mcg/min of Nitro, with an external urinary catheter. Her blood pressure is in the 150's, SpO2 >92%, HR 90's and NSR. Orders released, 4 eye assessment completed, sacral border placed on coccyx, chlorhexidine bath provided, admission complete, and patient oriented to room / call button. Daughter [employer] updated via phone. NP Neva bedside. Consult to pulm and cards, echo ordered, blood cultures pending.

## 2023-09-03 NOTE — PROGRESS NOTES
09/03/23 1129   Oxygen Therapy/Pulse Ox   O2 Therapy Oxygen   O2 Device Non-rebreather mask  (and highflow 8 lpm)   Pulse 93   Respirations 22   SpO2 95 %

## 2023-09-03 NOTE — PROGRESS NOTES
09/03/23 0106   RT Protocol   History Pulmonary Disease 2   Respiratory pattern 0   Breath sounds 2   Cough 0   Bronchodilator Assessment Score 4

## 2023-09-03 NOTE — ED NOTES
Pt desat to 84% on 8L high flow when switching from tank to wall O2. Increased to 10L high flow. Currently 93% on 10L.        Peggy Nicolas RN  09/02/23 2013

## 2023-09-03 NOTE — ED NOTES
Noticed SPO2 was off pt. RN to room. Pt took high flow O2 and SPO2 off. Replaced O2 and SPO2 probe. Pt at 65% on RA. Placed on non rebreather at 15L. Currently at 88% on 15L non rebreather.       Gilford Reach, RN  09/02/23 4860

## 2023-09-03 NOTE — CONSULTS
[facility], [address] [facility]                                  CONSULTATION    PATIENT NAME: Esha Steele                  :        1950  MED REC NO:   [id number]                          ROOM:       2419  ACCOUNT NO:   [de-identified]                           ADMIT DATE: 2023  PROVIDER:     Sandi Lorenzo. Yeimy John MD    CONSULT DATE:  2023    REASON FOR CONSULTATION:  Shortness of breath. HISTORY OF PRESENT ILLNESS:  A 50-year-old female who has not seen a  physician for possibly few years. She presented with reported shortness  of breath. However, when I evaluated the patient, she was very vague of  her symptoms. Only stated that the reason she was here is because her  family made her come in. She actually denies chest pain or shortness of  breath to me. However per the notes, it was reported that she was short  of breath for the past couple of weeks, she had associated general  malaise, weakness, no particular precipitating factor, it has not been  getting better, it was getting progressively worse. There is no report  of any associated chest pain. PAST MEDICAL HISTORY:  1. Right kidney mass. 2.  Lung mass. SOCIAL HISTORY:  She smokes. FAMILY HISTORY:  Positive for heart disease. REVIEW OF SYSTEMS:  Denies fevers, chills, or cough. Denies focal  neurologic symptoms. Denies rash. No dysuria. Denies melena. Denies  syncope. Denies palpitations. All other systems are negative except as  present illness. ALLERGIES:  No known drug allergies. MEDICATIONS:  See list in the chart, which I have reviewed. PHYSICAL EXAMINATION:  GENERAL:  A well-developed, well-nourished white female, in no acute  distress. VITAL SIGNS:  Blood pressure is 113/65, heart rate 88, respirations 20,  temperature is 98. She is 89% saturating on oxygen. HEENT:  Normocephalic and atraumatic.   Oropharynx

## 2023-09-03 NOTE — PROGRESS NOTES
4 Eyes Skin Assessment     NAME:  Emerald Cottrell  YOB: 1950  MEDICAL RECORD NUMBER:  [id number]    The patient is being assessed for  Admission    I agree that at least one RN has performed a thorough Head to Toe Skin Assessment on the patient. ALL assessment sites listed below have been assessed. Areas assessed by both nurses:    Head, Face, Ears, Shoulders, Back, Chest, Arms, Elbows, Hands, Sacrum. Buttock, Coccyx, Ischium, and Legs. Feet and Heels        Does the Patient have a Wound?  No noted wound(s), does have excoriation/red area right groin       Lorenzo Prevention initiated by RN: Yes  Wound Care Orders initiated by RN: No    Pressure Injury (Stage 3,4, Unstageable, DTI, NWPT, and Complex wounds) if present, place Wound referral order by RN under : No    New Ostomies, if present place, Ostomy referral order under : No     Nurse 1 eSignature: Electronically signed by Rakan Crane RN on 9/3/23 at 2:42 AM EDT      Nurse 2 eSignature: Electronically signed by Tanner Dominguez RN on 9/3/23 at 7:01 AM EDT

## 2023-09-03 NOTE — PROGRESS NOTES
09/03/23 1131   Oxygen Therapy/Pulse Ox   O2 Therapy Oxygen   O2 Device High flow nasal cannula   O2 Flow Rate (L/min) 8 L/min   Pulse 90   Respirations 18   SpO2 95 %

## 2023-09-03 NOTE — PLAN OF CARE
Problem: Discharge Planning  Goal: Discharge to home or other facility with appropriate resources  Outcome: Progressing  Will discharge to home where she lives with many family members     Problem: Safety - Adult  Goal: Free from fall injury  Outcome: Progressing  Patient has been on bed rest. Will progress today     Problem: Pain  Goal: Verbalizes/displays adequate comfort level or baseline comfort level  Outcome: Progressing  Patient denies pain most of the night. This morning stated she is having some upper neck pain or cramp. Relieved with repositioning.

## 2023-09-03 NOTE — H&P
Hospital Medicine History & Physical        Date of Service: 09/02/2023    Time of Service: 2145    Disposition:    [x]Admitted to inpatient status with expected LOS greater than two midnights due to medical therapy. []Placed in observation status. Chief Admission Complaint:  Dyspnea at home    Presenting Admission History: Amanda Villalpando is a/an 75 y.o. female with no significant past medical history who presents to [facility] emergency for acute dyspnea at home. She reports family thought she was turning blue but she adamantly denies feeling any symptoms of dyspnea, chest pain, or recent chest congestion. She self-discloses she has not routinely had medical care in quite some time, but discloses she has a known lung nodule. On arrival here, she was exquisitely hypoxic in the 40s, but responded on HFNC 15L/min to 88-90%. Her evaluation here included laboratory studies, EKG, and CTPA study. CT showed no evidence of acute PE, but small right pleural effusion. CXR showed right hilar mass, vascular congestion noted as well. Pertinent laboratory studies include normal chemistry panel, , elevated BNP at 3,459, troponin trend of 103, 106, and 75, respectively. Cell count showed normal WBC at 8.3, hemoglobin 15.3, hematocrit 47, and platelets 425. Flu and COVID testing were both negative. VBG showed a pH of 7.31, PCO2 58, pO2 36, and HCO3 29. She received azithromycin 500mg IVPB, Lasix 20mg IVP x 1, Solu-Medrol 125mg IVP x 1, and an extended albuterol treatment. She was also noted to have markedly elevated BP trend, SBP max 180, which nitroglycerin infusion was initiated. Hospital team was consulted to admit this patient for acute hypoxic respiratory failure.      Assessment/Plan:      Current Principal Problem:  Acute respiratory failure with hypoxia and hypercapnia (HCC)    Acute Hypoxic Respiratory Failure  - Admit to ICU  - Suspect COPD exacerbation: VBG showing CO2 retention,

## 2023-09-03 NOTE — CONSULTS
[id number]    SOB - primarily pulmonary.  Some component dCHF  Elev trop - possible ischemic heart disease  Acute dCHF  COPD  HTN  Abnormal EKG  Possible PNA  Lung mass  Smoker  PAD  Pleural effusion     ASA, statin  Stress test   Diuresis  Monitor BMP

## 2023-09-04 PROBLEM — R06.89 HYPERCARBIA: Status: ACTIVE | Noted: 2023-09-04

## 2023-09-04 PROBLEM — I27.20 PULMONARY HTN (HCC): Status: ACTIVE | Noted: 2023-09-04

## 2023-09-04 LAB
ANION GAP SERPL CALCULATED.3IONS-SCNC: 5 MMOL/L (ref 3–16)
BASOPHILS # BLD: 0.1 K/UL (ref 0–0.2)
BASOPHILS NFR BLD: 0.6 %
BUN SERPL-MCNC: 20 MG/DL (ref 7–20)
CALCIUM SERPL-MCNC: 9 MG/DL (ref 8.3–10.6)
CHLORIDE SERPL-SCNC: 94 MMOL/L (ref 99–110)
CO2 SERPL-SCNC: 40 MMOL/L (ref 21–32)
CREAT SERPL-MCNC: 0.9 MG/DL (ref 0.6–1.2)
DEPRECATED RDW RBC AUTO: 17.7 % (ref 12.4–15.4)
EKG ATRIAL RATE: 81 BPM
EKG DIAGNOSIS: NORMAL
EKG P AXIS: 65 DEGREES
EKG P-R INTERVAL: 166 MS
EKG Q-T INTERVAL: 396 MS
EKG QRS DURATION: 100 MS
EKG QTC CALCULATION (BAZETT): 460 MS
EKG R AXIS: 88 DEGREES
EKG T AXIS: -18 DEGREES
EKG VENTRICULAR RATE: 81 BPM
EOSINOPHIL # BLD: 0 K/UL (ref 0–0.6)
EOSINOPHIL NFR BLD: 0.4 %
EST. AVERAGE GLUCOSE BLD GHB EST-MCNC: 125.5 MG/DL
GFR SERPLBLD CREATININE-BSD FMLA CKD-EPI: >60 ML/MIN/{1.73_M2}
GLUCOSE SERPL-MCNC: 137 MG/DL (ref 70–99)
HBA1C MFR BLD: 6 %
HCT VFR BLD AUTO: 44.2 % (ref 36–48)
HGB BLD-MCNC: 14 G/DL (ref 12–16)
LYMPHOCYTES # BLD: 0.9 K/UL (ref 1–5.1)
LYMPHOCYTES NFR BLD: 9.1 %
MAGNESIUM SERPL-MCNC: 2 MG/DL (ref 1.8–2.4)
MCH RBC QN AUTO: 29 PG (ref 26–34)
MCHC RBC AUTO-ENTMCNC: 31.7 G/DL (ref 31–36)
MCV RBC AUTO: 91.5 FL (ref 80–100)
MONOCYTES # BLD: 1 K/UL (ref 0–1.3)
MONOCYTES NFR BLD: 10.3 %
NEUTROPHILS # BLD: 7.5 K/UL (ref 1.7–7.7)
NEUTROPHILS NFR BLD: 79.6 %
PHOSPHATE SERPL-MCNC: 4.1 MG/DL (ref 2.5–4.9)
PLATELET # BLD AUTO: 226 K/UL (ref 135–450)
PMV BLD AUTO: 7.4 FL (ref 5–10.5)
POTASSIUM SERPL-SCNC: 3.5 MMOL/L (ref 3.5–5.1)
RBC # BLD AUTO: 4.83 M/UL (ref 4–5.2)
SODIUM SERPL-SCNC: 139 MMOL/L (ref 136–145)
WBC # BLD AUTO: 9.5 K/UL (ref 4–11)

## 2023-09-04 PROCEDURE — 83735 ASSAY OF MAGNESIUM: CPT

## 2023-09-04 PROCEDURE — 85025 COMPLETE CBC W/AUTO DIFF WBC: CPT

## 2023-09-04 PROCEDURE — 6370000000 HC RX 637 (ALT 250 FOR IP): Performed by: INTERNAL MEDICINE

## 2023-09-04 PROCEDURE — 6360000002 HC RX W HCPCS: Performed by: INTERNAL MEDICINE

## 2023-09-04 PROCEDURE — 1200000000 HC SEMI PRIVATE

## 2023-09-04 PROCEDURE — 2580000003 HC RX 258: Performed by: INTERNAL MEDICINE

## 2023-09-04 PROCEDURE — 2580000003 HC RX 258: Performed by: NURSE PRACTITIONER

## 2023-09-04 PROCEDURE — 99233 SBSQ HOSP IP/OBS HIGH 50: CPT | Performed by: INTERNAL MEDICINE

## 2023-09-04 PROCEDURE — 94640 AIRWAY INHALATION TREATMENT: CPT

## 2023-09-04 PROCEDURE — 2700000000 HC OXYGEN THERAPY PER DAY

## 2023-09-04 PROCEDURE — 94761 N-INVAS EAR/PLS OXIMETRY MLT: CPT

## 2023-09-04 PROCEDURE — 84100 ASSAY OF PHOSPHORUS: CPT

## 2023-09-04 PROCEDURE — 93010 ELECTROCARDIOGRAM REPORT: CPT | Performed by: INTERNAL MEDICINE

## 2023-09-04 PROCEDURE — 36415 COLL VENOUS BLD VENIPUNCTURE: CPT

## 2023-09-04 PROCEDURE — 6370000000 HC RX 637 (ALT 250 FOR IP): Performed by: NURSE PRACTITIONER

## 2023-09-04 PROCEDURE — 6370000000 HC RX 637 (ALT 250 FOR IP): Performed by: HOSPITALIST

## 2023-09-04 PROCEDURE — 80048 BASIC METABOLIC PNL TOTAL CA: CPT

## 2023-09-04 RX ORDER — PREDNISONE 20 MG/1
20 TABLET ORAL DAILY
Status: DISCONTINUED | OUTPATIENT
Start: 2023-09-05 | End: 2023-09-07 | Stop reason: HOSPADM

## 2023-09-04 RX ORDER — FUROSEMIDE 10 MG/ML
40 INJECTION INTRAMUSCULAR; INTRAVENOUS DAILY
Status: DISCONTINUED | OUTPATIENT
Start: 2023-09-05 | End: 2023-09-07 | Stop reason: HOSPADM

## 2023-09-04 RX ADMIN — METOPROLOL TARTRATE 25 MG: 25 TABLET, FILM COATED ORAL at 20:43

## 2023-09-04 RX ADMIN — NITROGLYCERIN 0.5 INCH: 20 OINTMENT TOPICAL at 06:16

## 2023-09-04 RX ADMIN — SODIUM CHLORIDE, PRESERVATIVE FREE 10 ML: 5 INJECTION INTRAVENOUS at 20:45

## 2023-09-04 RX ADMIN — LOSARTAN POTASSIUM 50 MG: 25 TABLET, FILM COATED ORAL at 09:21

## 2023-09-04 RX ADMIN — NITROGLYCERIN 0.5 INCH: 20 OINTMENT TOPICAL at 11:35

## 2023-09-04 RX ADMIN — NITROGLYCERIN 0.5 INCH: 20 OINTMENT TOPICAL at 00:20

## 2023-09-04 RX ADMIN — PANTOPRAZOLE SODIUM 40 MG: 40 TABLET, DELAYED RELEASE ORAL at 06:15

## 2023-09-04 RX ADMIN — ACETAZOLAMIDE 500 MG: 500 INJECTION, POWDER, LYOPHILIZED, FOR SOLUTION INTRAVENOUS at 12:32

## 2023-09-04 RX ADMIN — ASPIRIN 325 MG: 325 TABLET, COATED ORAL at 09:21

## 2023-09-04 RX ADMIN — AMLODIPINE BESYLATE 5 MG: 5 TABLET ORAL at 09:21

## 2023-09-04 RX ADMIN — TIOTROPIUM BROMIDE AND OLODATEROL 2 PUFF: 3.124; 2.736 SPRAY, METERED RESPIRATORY (INHALATION) at 08:04

## 2023-09-04 RX ADMIN — FUROSEMIDE 40 MG: 10 INJECTION, SOLUTION INTRAMUSCULAR; INTRAVENOUS at 09:21

## 2023-09-04 RX ADMIN — SODIUM CHLORIDE, PRESERVATIVE FREE 10 ML: 5 INJECTION INTRAVENOUS at 09:22

## 2023-09-04 RX ADMIN — PREDNISONE 30 MG: 20 TABLET ORAL at 09:21

## 2023-09-04 RX ADMIN — ATORVASTATIN CALCIUM 40 MG: 40 TABLET, FILM COATED ORAL at 20:43

## 2023-09-04 RX ADMIN — METOPROLOL TARTRATE 25 MG: 25 TABLET, FILM COATED ORAL at 09:21

## 2023-09-04 ASSESSMENT — PAIN SCALES - GENERAL
PAINLEVEL_OUTOF10: 0

## 2023-09-04 NOTE — PLAN OF CARE
Problem: Discharge Planning  Goal: Discharge to home or other facility with appropriate resources  9/4/2023 1513 by Marbella Barahona RN  Outcome: Progressing  Flowsheets (Taken 9/4/2023 1513)  Discharge to home or other facility with appropriate resources:   Identify barriers to discharge with patient and caregiver   Arrange for needed discharge resources and transportation as appropriate   Identify discharge learning needs (meds, wound care, etc)     Problem: Discharge Planning  Goal: Discharge to home or other facility with appropriate resources  9/4/2023 1512 by Marbella Barahona RN  Outcome: Progressing  Flowsheets (Taken 9/4/2023 1512)  Discharge to home or other facility with appropriate resources:   Identify barriers to discharge with patient and caregiver   Arrange for needed discharge resources and transportation as appropriate   Identify discharge learning needs (meds, wound care, etc)     Problem: Safety - Adult  Goal: Free from fall injury  Outcome: Progressing  Flowsheets (Taken 9/4/2023 1512)  Free From Fall Injury: Instruct family/caregiver on patient safety     Problem: Respiratory - Adult  Goal: Achieves optimal ventilation and oxygenation  Outcome: Progressing  Flowsheets (Taken 9/4/2023 1513)  Achieves optimal ventilation and oxygenation:   Assess for changes in respiratory status   Assess for changes in mentation and behavior   Position to facilitate oxygenation and minimize respiratory effort   Oxygen supplementation based on oxygen saturation or arterial blood gases   Assess and instruct to report shortness of breath or any respiratory difficulty   Respiratory therapy support as indicated     Problem: Cardiovascular - Adult  Goal: Maintains optimal cardiac output and hemodynamic stability  Outcome: Progressing  Flowsheets (Taken 9/4/2023 1513)  Maintains optimal cardiac output and hemodynamic stability:   Monitor blood pressure and heart rate   Monitor urine output and notify Licensed Independent Practitioner for values outside of normal range   Assess for signs of decreased cardiac output     Problem: Musculoskeletal - Adult  Goal: Return mobility to safest level of function  Outcome: Progressing  Flowsheets (Taken 9/4/2023 1513)  Return Mobility to Safest Level of Function:   Assess patient stability and activity tolerance for standing, transferring and ambulating with or without assistive devices   Assist with transfers and ambulation using safe patient handling equipment as needed

## 2023-09-04 NOTE — PROGRESS NOTES
09/04/23 1053   Encounter Summary   Encounter Overview/Reason  Advance Care Planning   Service Provided For: Patient   Referral/Consult From: Nurse   Last Encounter  09/04/23  ( attempted assistance with AD education; pt refused assistance)   Complexity of Encounter Low   Begin Time 1030   End Time  1040   Total Time Calculated 10 min   Advance Care Planning   Type ACP conversation   Assessment/Intervention/Outcome   Assessment Calm   Intervention Active listening   Outcome Expressed Gratitude

## 2023-09-04 NOTE — ACP (ADVANCE CARE PLANNING)
Advance Care Planning     Advance Care Planning Inpatient Note  Spiritual Care Department    Today's Date: 9/4/2023  Unit: [facility] - MED SURG    Received request from IDT Member. Upon review of chart and communication with care team, patient's decision making abilities are not in question. . Patient was/were present in the room during visit. Goals of ACP Conversation:  Discuss advance care planning documents    Health Care Decision Makers:     No healthcare decision makers have been documented. Click here to complete [address] including selection of the Healthcare Decision Maker Relationship (ie \"Primary\")  Summary:  [facility] [place] (Patient Wishes):  None     Assessment:  Pt declined assistance.   left AD document packet with pt for consideration    Interventions:  Patient DECLINED ACP conversation    Care Preferences Communicated:   No    Outcomes/Plan:  ACP Discussion: Refused    Electronically signed by Zoila Amin on 9/4/2023 at 10:50 AM

## 2023-09-04 NOTE — PROGRESS NOTES
V2.0    [facility] [place] Progress Note      Name:  Emerald Cottrell /Age/Sex: 1950  (75 y.o. female)   MRN & CSN:  [id number] & [id number] Encounter Date/Time: 2023 1:18 PM EDT   Location:  [facility]/1798-77 PCP: No primary care provider on file. Attending:James Chavez MD       Hospital Day: 3    Assessment and Recommendations     Patient is a 59-year-old female past medical history of hypertension, COPD, tobacco abuse, renal cell carcinoma status post right nephrectomy, who was admitted for acute hypoxic aspiratory failure and hypertensive urgency/emergency. #.  Acute hypoxic and hypercapnic respiratory failure likely due to COPD exacerbation  #. Acute exacerbation of COPD  #. Suspected acute elevation of diastolic congestive heart failure: Decompensated   #. Hypertensive urgency with history of uncontrolled hypertension  #. Right renal cell carcinoma status post nephrectomy  #. Tobacco abuse    Plan:  Doubt bacterial pneumonia: monitor off antibiotics. Obtain echocardiogram, stress test to rule out myocardial ischemia  Continue bronchodilators and wean off oxygen as tolerated  Continue IV steroids, azithromycin  Continue diuresis with Lasix  Continue other medications for chronic medical problem  Appreciate cardiology, pulmonary recommendations. DVT Prophylaxis: Lovenox. Code Status: Total Support. Barrier to DC: Lasix, stress test pending, cardiology and pulmonary clearance          Subjective:     Patient was seen and examined today. No acute events overnight. Currently requiring 9-10 L nasal cannula. Denies any chest pain, nausea, vomiting, belly pain. Remains afebrile with stable signs. Review of Systems:      Pertinent positives and negatives discussed in HPI    Objective:      Intake/Output Summary (Last 24 hours) at 2023 1207  Last data filed at 2023 0313  Gross per 24 hour   Intake 120 ml   Output 700 ml   Net -580 ml        Vitals:   Vitals:    23 0745 23 FINDINGS: Pulmonary Arteries: Pulmonary arteries are adequately opacified for evaluation. No evidence of intraluminal filling defect to suggest pulmonary embolism. Contrast mixing artifact in the right lower lobe pulmonary artery. Main pulmonary artery is normal in caliber. Mediastinum: No evidence of mediastinal lymphadenopathy. The heart and pericardium demonstrate no acute abnormality. There is no acute abnormality of the thoracic aorta. Lungs/pleura: The lungs are without acute process. No focal consolidation or pulmonary edema. Small right pleural effusion. Mild-to-moderate emphysema. Right basilar scarring versus atelectasis. Upper Abdomen: Limited images of the upper abdomen are unremarkable. Soft Tissues/Bones: No acute bone or soft tissue abnormality. 1. No evidence of pulmonary embolism or acute pulmonary abnormality. 2. Small right pleural effusion with right basilar scarring versus atelectasis. CBC:   Recent Labs     09/02/23 1816 09/03/23 0451 09/04/23  0820   WBC 8.3 7.2 9.5   HGB 15.3 14.1 14.0    168 226       BMP:    Recent Labs     09/02/23 1816 09/03/23 0451 09/04/23  0820    137 139   K 4.1 4.3 3.5   CL 99 101 94*   CO2 31 31 40*   BUN 14 12 20   CREATININE 0.9 0.8 0.9   GLUCOSE 150* 130* 137*       Hepatic:   Recent Labs     09/02/23 1816   AST 16   ALT 9*   BILITOT 0.6   ALKPHOS 118       Lipids:   Lab Results   Component Value Date/Time    CHOL 112 09/03/2023 04:51 AM    HDL 31 09/03/2023 04:51 AM    TRIG 47 09/03/2023 04:51 AM     Hemoglobin A1C:   Lab Results   Component Value Date/Time    LABA1C 6.0 09/03/2023 04:51 AM     TSH: No results found for: TSH  Troponin: No results found for: TROPONINT  Lactic Acid: No results for input(s): LACTA in the last 72 hours.   BNP:   Recent Labs     09/02/23 1816   PROBNP 3,459*       UA:  Lab Results   Component Value Date/Time    NITRU Negative 03/29/2018 09:13 AM    COLORU Yellow 03/29/2018 09:13 AM    PHUR 6.0

## 2023-09-04 NOTE — PROGRESS NOTES
Pt was on 4L high flow oxygen sat at 92%. Pt began to de-sat down to 86-87 % O2. Bumpbed pt back up to 5, began sat at 90%. Pt did not sustain 90 % or above long. Titrated up to 7L. Now sat at 91%.

## 2023-09-05 ENCOUNTER — APPOINTMENT (OUTPATIENT)
Dept: NUCLEAR MEDICINE | Age: 73
DRG: 280 | End: 2023-09-05
Payer: MEDICARE

## 2023-09-05 PROBLEM — J96.21 ACUTE AND CHRONIC RESPIRATORY FAILURE WITH HYPOXIA (HCC): Status: ACTIVE | Noted: 2023-09-05

## 2023-09-05 LAB
ANION GAP SERPL CALCULATED.3IONS-SCNC: 6 MMOL/L (ref 3–16)
BASOPHILS # BLD: 0.1 K/UL (ref 0–0.2)
BASOPHILS NFR BLD: 0.7 %
BUN SERPL-MCNC: 27 MG/DL (ref 7–20)
CALCIUM SERPL-MCNC: 9 MG/DL (ref 8.3–10.6)
CHLORIDE SERPL-SCNC: 98 MMOL/L (ref 99–110)
CO2 SERPL-SCNC: 37 MMOL/L (ref 21–32)
CREAT SERPL-MCNC: 0.9 MG/DL (ref 0.6–1.2)
DEPRECATED RDW RBC AUTO: 18.1 % (ref 12.4–15.4)
EOSINOPHIL # BLD: 0 K/UL (ref 0–0.6)
EOSINOPHIL NFR BLD: 0.4 %
GFR SERPLBLD CREATININE-BSD FMLA CKD-EPI: >60 ML/MIN/{1.73_M2}
GLUCOSE SERPL-MCNC: 121 MG/DL (ref 70–99)
HCT VFR BLD AUTO: 44.3 % (ref 36–48)
HGB BLD-MCNC: 14.2 G/DL (ref 12–16)
LV EF: 60 %
LVEF MODALITY: NORMAL
LYMPHOCYTES # BLD: 0.7 K/UL (ref 1–5.1)
LYMPHOCYTES NFR BLD: 8 %
MAGNESIUM SERPL-MCNC: 1.9 MG/DL (ref 1.8–2.4)
MCH RBC QN AUTO: 29.2 PG (ref 26–34)
MCHC RBC AUTO-ENTMCNC: 32.1 G/DL (ref 31–36)
MCV RBC AUTO: 91 FL (ref 80–100)
MONOCYTES # BLD: 0.7 K/UL (ref 0–1.3)
MONOCYTES NFR BLD: 8.2 %
NEUTROPHILS # BLD: 7.4 K/UL (ref 1.7–7.7)
NEUTROPHILS NFR BLD: 82.7 %
NT-PROBNP SERPL-MCNC: 1353 PG/ML (ref 0–449)
PLATELET # BLD AUTO: 221 K/UL (ref 135–450)
PMV BLD AUTO: 7.8 FL (ref 5–10.5)
POTASSIUM SERPL-SCNC: 4 MMOL/L (ref 3.5–5.1)
RBC # BLD AUTO: 4.87 M/UL (ref 4–5.2)
SODIUM SERPL-SCNC: 141 MMOL/L (ref 136–145)
WBC # BLD AUTO: 8.9 K/UL (ref 4–11)

## 2023-09-05 PROCEDURE — 93017 CV STRESS TEST TRACING ONLY: CPT

## 2023-09-05 PROCEDURE — 6370000000 HC RX 637 (ALT 250 FOR IP): Performed by: HOSPITALIST

## 2023-09-05 PROCEDURE — 94761 N-INVAS EAR/PLS OXIMETRY MLT: CPT

## 2023-09-05 PROCEDURE — 36415 COLL VENOUS BLD VENIPUNCTURE: CPT

## 2023-09-05 PROCEDURE — 83880 ASSAY OF NATRIURETIC PEPTIDE: CPT

## 2023-09-05 PROCEDURE — 6370000000 HC RX 637 (ALT 250 FOR IP): Performed by: INTERNAL MEDICINE

## 2023-09-05 PROCEDURE — 85025 COMPLETE CBC W/AUTO DIFF WBC: CPT

## 2023-09-05 PROCEDURE — 99232 SBSQ HOSP IP/OBS MODERATE 35: CPT | Performed by: NURSE PRACTITIONER

## 2023-09-05 PROCEDURE — 80048 BASIC METABOLIC PNL TOTAL CA: CPT

## 2023-09-05 PROCEDURE — 94640 AIRWAY INHALATION TREATMENT: CPT

## 2023-09-05 PROCEDURE — 83735 ASSAY OF MAGNESIUM: CPT

## 2023-09-05 PROCEDURE — 99232 SBSQ HOSP IP/OBS MODERATE 35: CPT | Performed by: INTERNAL MEDICINE

## 2023-09-05 PROCEDURE — 2700000000 HC OXYGEN THERAPY PER DAY

## 2023-09-05 PROCEDURE — 2580000003 HC RX 258: Performed by: NURSE PRACTITIONER

## 2023-09-05 PROCEDURE — 1200000000 HC SEMI PRIVATE

## 2023-09-05 PROCEDURE — 78452 HT MUSCLE IMAGE SPECT MULT: CPT

## 2023-09-05 PROCEDURE — 6370000000 HC RX 637 (ALT 250 FOR IP): Performed by: NURSE PRACTITIONER

## 2023-09-05 PROCEDURE — A9502 TC99M TETROFOSMIN: HCPCS | Performed by: INTERNAL MEDICINE

## 2023-09-05 PROCEDURE — 6360000002 HC RX W HCPCS: Performed by: INTERNAL MEDICINE

## 2023-09-05 PROCEDURE — 3430000000 HC RX DIAGNOSTIC RADIOPHARMACEUTICAL: Performed by: INTERNAL MEDICINE

## 2023-09-05 RX ORDER — REGADENOSON 0.08 MG/ML
0.4 INJECTION, SOLUTION INTRAVENOUS
Status: COMPLETED | OUTPATIENT
Start: 2023-09-05 | End: 2023-09-05

## 2023-09-05 RX ADMIN — SODIUM CHLORIDE, PRESERVATIVE FREE 10 ML: 5 INJECTION INTRAVENOUS at 20:25

## 2023-09-05 RX ADMIN — ASPIRIN 325 MG: 325 TABLET, COATED ORAL at 07:46

## 2023-09-05 RX ADMIN — TETROFOSMIN 32.6 MILLICURIE: 1.38 INJECTION, POWDER, LYOPHILIZED, FOR SOLUTION INTRAVENOUS at 10:10

## 2023-09-05 RX ADMIN — METOPROLOL TARTRATE 25 MG: 25 TABLET, FILM COATED ORAL at 07:47

## 2023-09-05 RX ADMIN — LOSARTAN POTASSIUM 50 MG: 25 TABLET, FILM COATED ORAL at 07:46

## 2023-09-05 RX ADMIN — TIOTROPIUM BROMIDE AND OLODATEROL 2 PUFF: 3.124; 2.736 SPRAY, METERED RESPIRATORY (INHALATION) at 08:27

## 2023-09-05 RX ADMIN — ATORVASTATIN CALCIUM 40 MG: 40 TABLET, FILM COATED ORAL at 20:25

## 2023-09-05 RX ADMIN — AMLODIPINE BESYLATE 5 MG: 5 TABLET ORAL at 07:46

## 2023-09-05 RX ADMIN — REGADENOSON 0.4 MG: 0.08 INJECTION, SOLUTION INTRAVENOUS at 10:10

## 2023-09-05 RX ADMIN — METOPROLOL TARTRATE 25 MG: 25 TABLET, FILM COATED ORAL at 20:26

## 2023-09-05 RX ADMIN — FUROSEMIDE 40 MG: 10 INJECTION, SOLUTION INTRAMUSCULAR; INTRAVENOUS at 12:40

## 2023-09-05 RX ADMIN — TETROFOSMIN 10.4 MILLICURIE: 1.38 INJECTION, POWDER, LYOPHILIZED, FOR SOLUTION INTRAVENOUS at 09:35

## 2023-09-05 RX ADMIN — SODIUM CHLORIDE, PRESERVATIVE FREE 10 ML: 5 INJECTION INTRAVENOUS at 07:47

## 2023-09-05 RX ADMIN — PREDNISONE 20 MG: 20 TABLET ORAL at 07:47

## 2023-09-05 NOTE — PLAN OF CARE
Problem: Discharge Planning  Goal: Discharge to home or other facility with appropriate resources  9/4/2023 2112 by Michelle Kong RN  Outcome: Progressing  9/4/2023 1513 by Tk Soriano RN  Outcome: Progressing  Flowsheets (Taken 9/4/2023 1513)  Discharge to home or other facility with appropriate resources:   Identify barriers to discharge with patient and caregiver   Arrange for needed discharge resources and transportation as appropriate   Identify discharge learning needs (meds, wound care, etc)  9/4/2023 1512 by Tk Soriano RN  Outcome: Progressing  Flowsheets (Taken 9/4/2023 1512)  Discharge to home or other facility with appropriate resources:   Identify barriers to discharge with patient and caregiver   Arrange for needed discharge resources and transportation as appropriate   Identify discharge learning needs (meds, wound care, etc)     Problem: Safety - Adult  Goal: Free from fall injury  9/4/2023 2112 by Michelle Kong RN  Outcome: Progressing  9/4/2023 1512 by Tk Soriano RN  Outcome: Progressing  Flowsheets (Taken 9/4/2023 1512)  Free From Fall Injury: Instruct family/caregiver on patient safety     Problem: Pain  Goal: Verbalizes/displays adequate comfort level or baseline comfort level  Outcome: Progressing     Problem: Respiratory - Adult  Goal: Achieves optimal ventilation and oxygenation  9/4/2023 2112 by Michelle Kong RN  Outcome: Progressing  9/4/2023 1513 by Tk Soriano RN  Outcome: Progressing  Flowsheets (Taken 9/4/2023 1513)  Achieves optimal ventilation and oxygenation:   Assess for changes in respiratory status   Assess for changes in mentation and behavior   Position to facilitate oxygenation and minimize respiratory effort   Oxygen supplementation based on oxygen saturation or arterial blood gases   Assess and instruct to report shortness of breath or any respiratory difficulty   Respiratory therapy support as indicated     Problem: Cardiovascular - Adult  Goal: Maintains

## 2023-09-05 NOTE — PLAN OF CARE
Patient's EF (Ejection Fraction) is greater than 40%    Heart Failure Medications:  Diuretics[de-identified] Furosemide    (One of the following REQUIRED for EF </= 40%/SYSTOLIC FAILURE but MAY be used in EF% >40%/DIASTOLIC FAILURE)        ACE[de-identified] None        ARB[de-identified] Losartan         ARNI[de-identified] None    (Beta Blockers)  NON- Evidenced Based Beta Blocker (for EF% >40%/DIASTOLIC FAILURE): Metoprolol TARTrate- Lopressor    Evidenced Based Beta Blocker::(REQUIRED for EF% <40%/SYSTOLIC FAILURE) None  . .................................................................................................................................................. Healthy Weight Tracking - BMI + Meds 4/5/2018 4/6/2018 8/11/2020 9/2/2023 9/3/2023 9/4/2023 9/5/2023   Weight 134 lb 153 lb 140 lb 145 lb 147 lb 11.3 oz 136 lb 9.6 oz 140 lb 12.8 oz   Height 5' 6\" 5' 6\" 5' 6\" 5' 6\" - - -   Body Mass Index - 24.69 kg/m2 22.59 kg/m2 23.4 kg/m2 23.84 kg/m2 22.05 kg/m2 22.73 kg/m2   Some recent data might be hidden         Patient's weights and intake/output reviewed: Yes    Daily Weight log at bedside, patient/family participation in use of log: \"yes    Patient's Last Weight: 63.9 kg obtained by standing scale. Difference of 3 lbs more than last documented weight. Intake/Output Summary (Last 24 hours) at 9/5/2023 1390  Last data filed at 9/5/2023 0803  Gross per 24 hour   Intake 60 ml   Output 1100 ml   Net -1040 ml       Education Booklet Provided: yes    Comorbidities Reviewed Yes    Patient has a past medical history of Allergic rhinitis and Malignant neoplasm of right kidney ([address]).      >>For CHF and Comorbidity documentation on Education Time and Topics, please see Education Tab    Progressive Mobility Assessment:  What is this patient's Current Level of Mobility?: Ambulatory- with Assistance  How was this patient Mobilized today?: Edge of Bed, Up to Chair, Bedside Commode,  Up to Toilet/Shower, Up in Room, and Up in Hallway, ambulated 5 ft

## 2023-09-05 NOTE — PROGRESS NOTES
Shift assessment completed and charted. 2L NC, will wean as tolerated. A&OX4. Bed alarm on. Bed locked and in lowest position. Call light within reach. Pt denies any other needs at this time. Will continue to monitor.

## 2023-09-05 NOTE — PROGRESS NOTES
V2.0    [facility] [place]      Name:  Ralph Hatch /Age/Sex: 1950  (75 y.o. female)   MRN & CSN:  [id number] & [id number] Encounter Date/Time: 2023 1:18 PM EDT   Location:  [facility] PCP: No primary care provider on file. Attending:James Bernstein MD       Hospital Day: 4    Assessment and Recommendations     Patient is a 62-year-old female past medical history of hypertension, COPD, tobacco abuse, renal cell carcinoma status post right nephrectomy, who was admitted for acute hypoxic aspiratory failure and hypertensive urgency/emergency. #.  Acute hypoxic and hypercapnic respiratory failure likely due to COPD exacerbation  #. Acute exacerbation of COPD  #. Suspected acute elevation of diastolic congestive heart failure: Decompensated   #. Hypertensive urgency with history of uncontrolled hypertension  #. Right renal cell carcinoma status post nephrectomy  #. Tobacco abuse    Plan:  Doubt bacterial pneumonia: monitor off antibiotics. Echo reviewed. stress test pending. Continue bronchodilators and wean off oxygen as tolerated  Continue IV steroids, azithromycin  Continue diuresis with Lasix  Continue other medications for chronic medical problem  Appreciate cardiology, pulmonary recommendations. DVT Prophylaxis: Lovenox. Code Status: Total Support. Barrier to DC:hypoxia, pending clinical improvement. Subjective:     Patient was seen and examined today. No acute events overnight. Currently requiring 9-10 L nasal cannula. Denies any chest pain, nausea, vomiting, belly pain. Remains afebrile with stable signs. Review of Systems:      Pertinent positives and negatives discussed in HPI    Objective:      Intake/Output Summary (Last 24 hours) at 2023 1326  Last data filed at 2023 1301  Gross per 24 hour   Intake 60 ml   Output 400 ml   Net -340 ml        Vitals:   Vitals:    23 0519 23 0730 23 0827 23 1230   BP:  (!) 148/84  131/74 Blunting right lateral costophrenic sulcus. Vascular engorgement and cephalization is demonstrated with bilateral peribronchial cuffing and perivascular haziness. Chronic appearing coarse interstitial densities predominate perihilar regions and lung bases, typical of sequela from smoking or other previous infectious/inflammatory process. The lungs are hyperinflated with increased translucency both lung apices and tapering of the vasculature in the upper lungs. No pneumothorax is seen. No acute osseous abnormality is identified. 1. Right hilar soft tissue fullness concerning for mass or adenopathy. 2. Right basilar consolidation/pleural effusion, pneumonia is a consideration. 3.  Congestive heart failure is most likely given the radiographic findings; pneumonia is also a consideration in areas of consolidation with pleural effusion. 4.  Pulmonary sequela typical of that seen with smoking, including COPD. 5. Calcific atherosclerosis aorta. 6. Cardiomegaly. RECOMMENDATION: IV contrast-enhanced chest CT     CT CHEST PULMONARY EMBOLISM W CONTRAST    Result Date: 9/2/2023  EXAMINATION: CTA OF THE CHEST 9/2/2023 8:01 pm TECHNIQUE: CTA of the chest was performed after the administration of intravenous contrast.  Multiplanar reformatted images are provided for review. MIP images are provided for review. Automated exposure control, iterative reconstruction, and/or weight based adjustment of the mA/kV was utilized to reduce the radiation dose to as low as reasonably achievable. COMPARISON: 03/20/2018 HISTORY: ORDERING SYSTEM PROVIDED HISTORY: SOB, eval for PE TECHNOLOGIST PROVIDED HISTORY: Reason for exam:->SOB, eval for PE Decision Support Exception - unselect if not a suspected or confirmed emergency medical condition->Emergency Medical Condition (MA) Reason for Exam: weakness and sob FINDINGS: Pulmonary Arteries: Pulmonary arteries are adequately opacified for evaluation.   No evidence of intraluminal filling defect

## 2023-09-05 NOTE — CONSULTS
Nutrition Education    Consult received for CHF diet education. Provided pt with written and verbal instruction on HF nutrition therapy. Handout discusses low sodium diet, daily weights, and fluid restriction. Briefly discussed low sodium diet. Pt voiced understanding. Time spent: 5 minutes    Educated on CHF diet  Learners: Patient  Readiness: Acceptance  Method: Explanation and Handout  Response: Verbalizes Understanding and Needs Reinforcement  Contact name and number provided.     Shannen Sharma RD, LD  Contact Number: [phone]

## 2023-09-05 NOTE — CARE COORDINATION
Case Management Assessment  Initial Evaluation    Date/Time of Evaluation: 9/5/2023 3:39 PM  Assessment Completed by: Maura Moran RN    If patient is discharged prior to next notation, then this note serves as note for discharge by case management. Patient Name: Itzel Gong                   YOB: 1950  Diagnosis: Hypoxia [R09.02]  COPD exacerbation ([address]) [J44.1]  Acute and chronic respiratory failure with hypoxia ([address]) [J96.21]  Acute respiratory failure with hypoxia and hypercapnia ([address]) [J96.01, J96.02]                   Date / Time: 9/2/2023  5:57 PM    Patient Admission Status: Inpatient   Readmission Risk (Low < 19, Mod (19-27), High > 27): Readmission Risk Score: 10.1    Current PCP: No primary care provider on file. PCP verified by CM? No (list provided)    Chart Reviewed: Yes      History Provided by: Patient  Patient Orientation: Alert and Oriented, Person, Place, Situation    Patient Cognition: Alert    Hospitalization in the last 30 days (Readmission):  No    If yes, Readmission Assessment in CM Navigator will be completed. Advance Directives:      Code Status: Full Code   Patient's Primary Decision Maker is: Legal Next of Kin (daughter Luci Maguire)      Discharge Planning:    Patient lives with: Children, Family Members Type of Home: House  Primary Care Giver: Self  Patient Support Systems include: Children   Current Financial resources:    Current community resources:    Current services prior to admission: None            Current DME:              Type of Home Care services:  None    ADLS  Prior functional level: Assistance with the following:, Mobility  Current functional level: Assistance with the following:, Mobility    PT AM-PAC:   /24  OT AM-PAC:   /24    Family can provide assistance at DC: Yes  Would you like Case Management to discuss the discharge plan with any other family members/significant others, and if so, who?  Yes  Plans to Return to Present Housing:

## 2023-09-05 NOTE — PLAN OF CARE
Problem: Safety - Adult  Goal: Free from fall injury  9/5/2023 0939 by Yenni Gabriel RN  Outcome: Progressing     Problem: Cardiovascular - Adult  Goal: Maintains optimal cardiac output and hemodynamic stability  9/5/2023 0939 by Yenni Gabriel RN  Outcome: Progressing     Problem: Pain  Goal: Verbalizes/displays adequate comfort level or baseline comfort level  9/5/2023 0939 by Yenni Gabriel RN  Outcome: Progressing

## 2023-09-06 ENCOUNTER — TELEPHONE (OUTPATIENT)
Dept: PULMONOLOGY | Age: 73
End: 2023-09-06

## 2023-09-06 DIAGNOSIS — J44.9 CHRONIC OBSTRUCTIVE PULMONARY DISEASE, UNSPECIFIED COPD TYPE (HCC): Primary | ICD-10-CM

## 2023-09-06 LAB
ANION GAP SERPL CALCULATED.3IONS-SCNC: 4 MMOL/L (ref 3–16)
BACTERIA BLD CULT ORG #2: NORMAL
BACTERIA BLD CULT: NORMAL
BUN SERPL-MCNC: 28 MG/DL (ref 7–20)
CALCIUM SERPL-MCNC: 8.9 MG/DL (ref 8.3–10.6)
CHLORIDE SERPL-SCNC: 97 MMOL/L (ref 99–110)
CO2 SERPL-SCNC: 40 MMOL/L (ref 21–32)
CREAT SERPL-MCNC: 0.9 MG/DL (ref 0.6–1.2)
GFR SERPLBLD CREATININE-BSD FMLA CKD-EPI: >60 ML/MIN/{1.73_M2}
GLUCOSE SERPL-MCNC: 89 MG/DL (ref 70–99)
MAGNESIUM SERPL-MCNC: 2.1 MG/DL (ref 1.8–2.4)
POTASSIUM SERPL-SCNC: 3.4 MMOL/L (ref 3.5–5.1)
SODIUM SERPL-SCNC: 141 MMOL/L (ref 136–145)

## 2023-09-06 PROCEDURE — 97535 SELF CARE MNGMENT TRAINING: CPT

## 2023-09-06 PROCEDURE — 99232 SBSQ HOSP IP/OBS MODERATE 35: CPT | Performed by: NURSE PRACTITIONER

## 2023-09-06 PROCEDURE — 6370000000 HC RX 637 (ALT 250 FOR IP): Performed by: NURSE PRACTITIONER

## 2023-09-06 PROCEDURE — 36415 COLL VENOUS BLD VENIPUNCTURE: CPT

## 2023-09-06 PROCEDURE — 97530 THERAPEUTIC ACTIVITIES: CPT

## 2023-09-06 PROCEDURE — 1200000000 HC SEMI PRIVATE

## 2023-09-06 PROCEDURE — 80048 BASIC METABOLIC PNL TOTAL CA: CPT

## 2023-09-06 PROCEDURE — 6360000002 HC RX W HCPCS: Performed by: INTERNAL MEDICINE

## 2023-09-06 PROCEDURE — 97116 GAIT TRAINING THERAPY: CPT

## 2023-09-06 PROCEDURE — 94761 N-INVAS EAR/PLS OXIMETRY MLT: CPT

## 2023-09-06 PROCEDURE — 2700000000 HC OXYGEN THERAPY PER DAY

## 2023-09-06 PROCEDURE — 97162 PT EVAL MOD COMPLEX 30 MIN: CPT

## 2023-09-06 PROCEDURE — 94640 AIRWAY INHALATION TREATMENT: CPT

## 2023-09-06 PROCEDURE — 6370000000 HC RX 637 (ALT 250 FOR IP): Performed by: INTERNAL MEDICINE

## 2023-09-06 PROCEDURE — 6370000000 HC RX 637 (ALT 250 FOR IP): Performed by: HOSPITALIST

## 2023-09-06 PROCEDURE — 97166 OT EVAL MOD COMPLEX 45 MIN: CPT

## 2023-09-06 PROCEDURE — 83735 ASSAY OF MAGNESIUM: CPT

## 2023-09-06 PROCEDURE — 99232 SBSQ HOSP IP/OBS MODERATE 35: CPT | Performed by: INTERNAL MEDICINE

## 2023-09-06 PROCEDURE — 2580000003 HC RX 258: Performed by: NURSE PRACTITIONER

## 2023-09-06 RX ORDER — POTASSIUM CHLORIDE 20 MEQ/1
40 TABLET, EXTENDED RELEASE ORAL ONCE
Status: COMPLETED | OUTPATIENT
Start: 2023-09-06 | End: 2023-09-06

## 2023-09-06 RX ADMIN — PREDNISONE 20 MG: 20 TABLET ORAL at 08:50

## 2023-09-06 RX ADMIN — FUROSEMIDE 40 MG: 10 INJECTION, SOLUTION INTRAMUSCULAR; INTRAVENOUS at 08:50

## 2023-09-06 RX ADMIN — SODIUM CHLORIDE, PRESERVATIVE FREE 10 ML: 5 INJECTION INTRAVENOUS at 20:54

## 2023-09-06 RX ADMIN — PANTOPRAZOLE SODIUM 40 MG: 40 TABLET, DELAYED RELEASE ORAL at 06:11

## 2023-09-06 RX ADMIN — LOSARTAN POTASSIUM 50 MG: 25 TABLET, FILM COATED ORAL at 08:49

## 2023-09-06 RX ADMIN — SODIUM CHLORIDE, PRESERVATIVE FREE 10 ML: 5 INJECTION INTRAVENOUS at 08:50

## 2023-09-06 RX ADMIN — ASPIRIN 325 MG: 325 TABLET, COATED ORAL at 08:49

## 2023-09-06 RX ADMIN — METOPROLOL TARTRATE 25 MG: 25 TABLET, FILM COATED ORAL at 20:49

## 2023-09-06 RX ADMIN — TIOTROPIUM BROMIDE AND OLODATEROL 2 PUFF: 3.124; 2.736 SPRAY, METERED RESPIRATORY (INHALATION) at 08:41

## 2023-09-06 RX ADMIN — ATORVASTATIN CALCIUM 40 MG: 40 TABLET, FILM COATED ORAL at 20:49

## 2023-09-06 RX ADMIN — AMLODIPINE BESYLATE 5 MG: 5 TABLET ORAL at 08:50

## 2023-09-06 RX ADMIN — METOPROLOL TARTRATE 25 MG: 25 TABLET, FILM COATED ORAL at 08:49

## 2023-09-06 RX ADMIN — POTASSIUM CHLORIDE 40 MEQ: 1500 TABLET, EXTENDED RELEASE ORAL at 12:20

## 2023-09-06 ASSESSMENT — PAIN SCALES - GENERAL
PAINLEVEL_OUTOF10: 0

## 2023-09-06 NOTE — CARE COORDINATION
Chart reviewed day 4. Care provided by cards and IM. Pt is from home with her children. She lives on the main floor but her bathroom is upstairs. PT/OT is recommending at bedside commode. Pt will also likely need to go home with O2. [facility] Pt is currently on 4L. Pt is on IV lasix with 2000 diuresis. K 3.4, BUN 28. Will continue to follow course for needs.  Ines Velasquez, RN

## 2023-09-06 NOTE — TELEPHONE ENCOUNTER
----- Message from Trinity Zhang MD sent at 9/6/2023  9:35 AM EDT -----    Patient needs a PFT in 3 to 4 weeks time and follow-up after that, if patient agrees  LM for pt.  To calll

## 2023-09-06 NOTE — PLAN OF CARE
Problem: Safety - Adult  Goal: Free from fall injury  9/6/2023 1627 by Wallace Goldman RN  Outcome: Progressing  Flowsheets (Taken 9/6/2023 1627)  Free From Fall Injury: Instruct family/caregiver on patient safety     Problem: Respiratory - Adult  Goal: Achieves optimal ventilation and oxygenation  Outcome: Progressing  Flowsheets (Taken 9/6/2023 1627)  Achieves optimal ventilation and oxygenation:   Assess for changes in respiratory status   Assess for changes in mentation and behavior   Position to facilitate oxygenation and minimize respiratory effort     Problem: Cardiovascular - Adult  Goal: Maintains optimal cardiac output and hemodynamic stability  Outcome: Progressing  Flowsheets (Taken 9/6/2023 1627)  Maintains optimal cardiac output and hemodynamic stability:   Monitor blood pressure and heart rate   Monitor urine output and notify Licensed Independent Practitioner for values outside of normal range   Assess for signs of decreased cardiac output     Problem: Musculoskeletal - Adult  Goal: Return mobility to safest level of function  Outcome: Progressing  Flowsheets (Taken 9/6/2023 1627)  Return Mobility to Safest Level of Function:   Assist with transfers and ambulation using safe patient handling equipment as needed   Assess patient stability and activity tolerance for standing, transferring and ambulating with or without assistive devices   Ensure adequate protection for wounds/incisions during mobilization   Obtain physical therapy/occupational therapy consults as needed

## 2023-09-06 NOTE — PROGRESS NOTES
Occupational Therapy  Facility/Department: [place] [address] - MED SURG  Occupational Therapy Initial Assessment and Treatment Note 1x    Name: Sebastian Martinez  : 1950  MRN: [id number]  Date of Service: 2023    Discharge Recommendations:  Home with assist PRN  OT Equipment Recommendations  Equipment Needed: Yes  Mobility Devices: ADL Assistive Devices  ADL Assistive Devices: Toileting - 3-in-1 Commode  Other: Pt resides on first floor which does not have a bathroom. She would need to climb full flight of steps to access bathroom. During today's session, pt's O2 sats decreased to 78% when walking. Bedside commode would enable pt to safely perform toileting on first level of home to conserve energy while maintaining O2 sats >90%. Patient Diagnosis(es): The primary encounter diagnosis was Acute and chronic respiratory failure with hypoxia ([address]). A diagnosis of COPD exacerbation ([address]) was also pertinent to this visit. Past Medical History:  has a past medical history of Allergic rhinitis and Malignant neoplasm of right kidney ([address] Past Surgical History:  has a past surgical history that includes Tonsillectomy; Colonoscopy; other surgical history (late 80s); and Kidney removal (Right, 2018). Assessment   Assessment: OT eval and tx completed after admission for acute respiratory failure and CHF. She reports independence at baseline. Pt required Supervision for toilet transfers and BR mobility without device. She did use RW for longer distances. Pt completed UE/LE ADLs with supervision/SBA. O2 sats decreased with walking in hallway to 78% and improved with rest break to 92% (RN increased O2 to 5L). Pt was educated on CHFeducation. OT will d/c today. Recommend bedside commode for pt to use at home.   Decision Making: Medium Complexity  REQUIRES OT FOLLOW-UP: No  Activity Tolerance  Activity Tolerance: Patient Tolerated treatment well  Activity Tolerance Comments: O2 sats did decrease to 78% as she

## 2023-09-06 NOTE — PROGRESS NOTES
Vascular engorgement and cephalization is demonstrated with bilateral peribronchial cuffing and perivascular haziness. Chronic appearing coarse interstitial densities predominate perihilar regions and lung bases, typical of sequela from smoking or other previous infectious/inflammatory process. The lungs are hyperinflated with increased translucency both lung apices and tapering of the vasculature in the upper lungs. No pneumothorax is seen. No acute osseous abnormality is identified. 1. Right hilar soft tissue fullness concerning for mass or adenopathy. 2. Right basilar consolidation/pleural effusion, pneumonia is a consideration. 3.  Congestive heart failure is most likely given the radiographic findings; pneumonia is also a consideration in areas of consolidation with pleural effusion. 4.  Pulmonary sequela typical of that seen with smoking, including COPD. 5. Calcific atherosclerosis aorta. 6. Cardiomegaly. RECOMMENDATION: IV contrast-enhanced chest CT     CT CHEST PULMONARY EMBOLISM W CONTRAST    Result Date: 9/2/2023  EXAMINATION: CTA OF THE CHEST 9/2/2023 8:01 pm TECHNIQUE: CTA of the chest was performed after the administration of intravenous contrast.  Multiplanar reformatted images are provided for review. MIP images are provided for review. Automated exposure control, iterative reconstruction, and/or weight based adjustment of the mA/kV was utilized to reduce the radiation dose to as low as reasonably achievable. COMPARISON: 03/20/2018 HISTORY: ORDERING SYSTEM PROVIDED HISTORY: SOB, eval for PE TECHNOLOGIST PROVIDED HISTORY: Reason for exam:->SOB, eval for PE Decision Support Exception - unselect if not a suspected or confirmed emergency medical condition->Emergency Medical Condition (MA) Reason for Exam: weakness and sob FINDINGS: Pulmonary Arteries: Pulmonary arteries are adequately opacified for evaluation. No evidence of intraluminal filling defect to suggest pulmonary embolism.   Contrast mixing artifact in the right lower lobe pulmonary artery. Main pulmonary artery is normal in caliber. Mediastinum: No evidence of mediastinal lymphadenopathy. The heart and pericardium demonstrate no acute abnormality. There is no acute abnormality of the thoracic aorta. Lungs/pleura: The lungs are without acute process. No focal consolidation or pulmonary edema. Small right pleural effusion. Mild-to-moderate emphysema. Right basilar scarring versus atelectasis. Upper Abdomen: Limited images of the upper abdomen are unremarkable. Soft Tissues/Bones: No acute bone or soft tissue abnormality. 1. No evidence of pulmonary embolism or acute pulmonary abnormality. 2. Small right pleural effusion with right basilar scarring versus atelectasis. CBC:   Recent Labs     09/04/23  0820 09/05/23 0639   WBC 9.5 8.9   HGB 14.0 14.2    221       BMP:    Recent Labs     09/04/23  0820 09/05/23  0639 09/06/23  0639    141 141   K 3.5 4.0 3.4*   CL 94* 98* 97*   CO2 40* 37* 40*   BUN 20 27* 28*   CREATININE 0.9 0.9 0.9   GLUCOSE 137* 121* 89       Hepatic:   No results for input(s): AST, ALT, ALB, BILITOT, ALKPHOS in the last 72 hours. Lipids:   Lab Results   Component Value Date/Time    CHOL 112 09/03/2023 04:51 AM    HDL 31 09/03/2023 04:51 AM    TRIG 47 09/03/2023 04:51 AM     Hemoglobin A1C:   Lab Results   Component Value Date/Time    LABA1C 6.0 09/03/2023 04:51 AM     TSH: No results found for: TSH  Troponin: No results found for: TROPONINT  Lactic Acid: No results for input(s): LACTA in the last 72 hours.   BNP:   Recent Labs     09/05/23  0639   PROBNP 1,353*       UA:  Lab Results   Component Value Date/Time    NITRU Negative 03/29/2018 09:13 AM    COLORU Yellow 03/29/2018 09:13 AM    PHUR 6.0 03/29/2018 09:13 AM    WBCUA 6-10 03/29/2018 09:13 AM    RBCUA 0-2 03/29/2018 09:13 AM    MUCUS 2+ 03/29/2018 09:13 AM    BACTERIA 1+ 03/02/2018 12:54 PM    CLARITYU Clear 03/29/2018 09:13 AM    SPECGRAV

## 2023-09-06 NOTE — PROGRESS NOTES
Patient's EF (Ejection Fraction) is greater than 40%    Heart Failure Medications:  Diuretics[de-identified] Furosemide    (One of the following REQUIRED for EF </= 40%/SYSTOLIC FAILURE but MAY be used in EF% >40%/DIASTOLIC FAILURE)        ACE[de-identified] None        ARB[de-identified] Losartan         ARNI[de-identified] None    (Beta Blockers)  NON- Evidenced Based Beta Blocker (for EF% >40%/DIASTOLIC FAILURE): Metoprolol TARTrate- Lopressor    Evidenced Based Beta Blocker::(REQUIRED for EF% <40%/SYSTOLIC FAILURE) None  . .................................................................................................................................................. Healthy Weight Tracking - BMI + Meds 4/6/2018 8/11/2020 9/2/2023 9/3/2023 9/4/2023 9/5/2023 9/6/2023   Weight 153 lb 140 lb 145 lb 147 lb 11.3 oz 136 lb 9.6 oz 140 lb 12.8 oz 145 lb 4.8 oz   Height 5' 6\" 5' 6\" 5' 6\" - - - -   Body Mass Index 24.69 kg/m2 22.59 kg/m2 23.4 kg/m2 23.84 kg/m2 22.05 kg/m2 22.73 kg/m2 23.45 kg/m2   Some recent data might be hidden         Patient's weights and intake/output reviewed: Yes    Daily Weight log at bedside, patient/family participation in use of log: no    Patient's Last Weight: 145 lbs 4.8 oz lbs obtained by standing scale. Difference of 4 lbs 2.8 oz lbs less than last documented weight. Intake/Output Summary (Last 24 hours) at 9/6/2023 0719  Last data filed at 9/6/2023 1337  Gross per 24 hour   Intake 460 ml   Output 1400 ml   Net -940 ml       Education Booklet Provided: yes    Comorbidities Reviewed Yes    Patient has a past medical history of Allergic rhinitis and Malignant neoplasm of right kidney ([address]).      >>For CHF and Comorbidity documentation on Education Time and Topics, please see Education Tab    Progressive Mobility Assessment:  What is this patient's Current Level of Mobility?: Ambulatory- with Assistance  How was this patient Mobilized today?: Edge of Bed, Up to Chair,  Up to Toilet/Shower, Up in Room, and Up in [place], ambulated 5

## 2023-09-06 NOTE — PROGRESS NOTES
atelectasis. Upper Abdomen: Limited images of the upper abdomen are unremarkable. Soft Tissues/Bones: No acute bone or soft tissue abnormality. 1. No evidence of pulmonary embolism or acute pulmonary abnormality. 2. Small right pleural effusion with right basilar scarring versus atelectasis. ECHO- Summary   Normal left ventricle systolic function with an estimated ejection fraction   of 55%. No regional wall motion abnormalities are seen. Normal left ventricular diastolic filling pressure. The right ventricle appears dilated with normal function. The right atrium is mildly dilated. Mild tricuspid regurgitation. Systolic pulmonary artery pressure (SPAP) estimated at 41 mmHg (right atrial   pressure 15 mmHg), consistent with mild pulmonary hypertension. Nuclear stress test- Summary   Normal LV function. There is nearly uniform isotope uptake at stress and rest. There is no clear   evidence of myocardial ischemia or scar. Assessment:  Principal Problem:    Acute respiratory failure with hypoxia and hypercapnia ([facility])  Active Problems:    Hypertensive emergency    COPD (chronic obstructive pulmonary disease) (HCC)    Acute diastolic heart failure (HCC)    Smoking history    NSTEMI (non-ST elevated myocardial infarction) ([facility])    Elevated brain natriuretic peptide (BNP) level    Hypercarbia    Pulmonary HTN (HCC)    Acute and chronic respiratory failure with hypoxia ([facility])  Resolved Problems:    * No resolved hospital problems.  *          Plan:   O2 supplementation to keep SaO2 between 90-94% ONLY   Please titrate oxygen as per above parameters-nursing communication has been sent to that effect  Patient was on 4 L of nasal oxygen with saturation of 96% when seen-oxygen can be tapered down  Patient has acute NSTEM/diastolic heart failure/elevated BNP/COPD exacerbation along with hypertensive emergency causing patient to have symptoms  Cardiac medications as per cardiology  Echocardiogram results reviewed  Patient's stress test was negative  Bronchodilators  Patient does not have any acute bronchospasm at this time  Prednisone dose was decreased to 20 mg once a day  Patient is off Nitropaste  Patient continues to be on Lasix at this time  Patient has worsening hypercarbia-consider another dose of Diamox and reassess  Monitor input output and BMP  Correct electrolytes on  whenever necessary basis  NRT if patient wants   PUD prophylaxis   Bronchodilators  Patient will benefit from a PFT as an outpatient  PT/OT    ? Discharge planning         Electronically signed by:  Isela Sanchez MD    9/6/2023    9:07 AM.

## 2023-09-06 NOTE — PROGRESS NOTES
Physical Therapy  Facility/Department: [facility] B3 - MED SURG  Physical Therapy Initial Assessment/Treatment     Name: Sangeeta Colon  : 1950  MRN: [id number]  Date of Service: 2023    Discharge Recommendations:  Home with assist PRN   PT Equipment Recommendations  Equipment Needed: Yes  Mobility Devices: Elie Phoenix: Rolling      Patient Diagnosis(es): The primary encounter diagnosis was Acute and chronic respiratory failure with hypoxia ([address]). A diagnosis of COPD exacerbation ([address]) was also pertinent to this visit. Past Medical History:  has a past medical history of Allergic rhinitis and Malignant neoplasm of right kidney ([address] Past Surgical History:  has a past surgical history that includes Tonsillectomy; Colonoscopy; other surgical history (late 80s); and Kidney removal (Right, 2018). Assessment   Body Structures, Functions, Activity Limitations Requiring Skilled Therapeutic Intervention: Decreased functional mobility ; Decreased balance;Decreased strength;Decreased endurance  Assessment: Pt to [facility] with diagnosis of acute respiratory failure with hyopxia. PTA pt lives in 2 story house with family. Pt sleeps on the main floor, but bathroom in on 2nd floor. Pt was independent with transfers and ambuation with no AD. Pt currently presents below baseline function. Pt able to complete transfers with grossly SBA to supervision and ambulate with CGA to SBA. Pt initially ambulated with no AD, gait is unsteady with path deviations, trialed RW and pt with imporved balance and safety. Pt will benefit from skilled PT services in acute setting to address current deficits, and will benefit from RW at home. It is anticipated pt will be safe to DC home with assist PRN when deemed medically appropriate.   Decision Making: Low Complexity  Barriers to Learning: None  Requires PT Follow-Up: Yes  Activity Tolerance  Activity Tolerance: Patient tolerated evaluation without incident;Patient tolerated treatment

## 2023-09-06 NOTE — PLAN OF CARE
Patient's EF (Ejection Fraction) is greater than 40%    Heart Failure Medications:  Diuretics[de-identified] Furosemide    (One of the following REQUIRED for EF </= 40%/SYSTOLIC FAILURE but MAY be used in EF% >40%/DIASTOLIC FAILURE)        ACE[de-identified] None        ARB[de-identified] Losartan         ARNI[de-identified] None    (Beta Blockers)  NON- Evidenced Based Beta Blocker (for EF% >40%/DIASTOLIC FAILURE): Metoprolol TARTrate- Lopressor    Evidenced Based Beta Blocker::(REQUIRED for EF% <40%/SYSTOLIC FAILURE) None  . .................................................................................................................................................. Healthy Weight Tracking - BMI + Meds 4/6/2018 8/11/2020 9/2/2023 9/3/2023 9/4/2023 9/5/2023 9/6/2023   Weight 153 lb 140 lb 145 lb 147 lb 11.3 oz 136 lb 9.6 oz 140 lb 12.8 oz 145 lb 4.8 oz   Height 5' 6\" 5' 6\" 5' 6\" - - - -   Body Mass Index 24.69 kg/m2 22.59 kg/m2 23.4 kg/m2 23.84 kg/m2 22.05 kg/m2 22.73 kg/m2 23.45 kg/m2   Some recent data might be hidden         Patient's weights and intake/output reviewed: Yes    Daily Weight log at bedside, patient/family participation in use of log: \"yes    Patient's Last Weight: 65.9 kg obtained by standing scale. Difference of 2.1 kg more than last documented weight. Intake/Output Summary (Last 24 hours) at 9/6/2023 5032  Last data filed at 9/6/2023 0510  Gross per 24 hour   Intake 510 ml   Output 2000 ml   Net -1490 ml       Education Booklet Provided: yes    Comorbidities Reviewed Yes    Patient has a past medical history of Allergic rhinitis and Malignant neoplasm of right kidney ([address] >>For CHF and Comorbidity documentation on Education Time and Topics, please see Education Tab    Progressive Mobility Assessment:  What is this patient's Current Level of Mobility?: Ambulatory- with Assistance  How was this patient Mobilized today?: Edge of Bed, ambulated 0 ft                 With Whom?  Nurse, PCA, and Self                 Level of

## 2023-09-07 VITALS
OXYGEN SATURATION: 91 % | BODY MASS INDEX: 23 KG/M2 | DIASTOLIC BLOOD PRESSURE: 57 MMHG | RESPIRATION RATE: 17 BRPM | HEIGHT: 66 IN | HEART RATE: 67 BPM | TEMPERATURE: 97.3 F | WEIGHT: 143.1 LBS | SYSTOLIC BLOOD PRESSURE: 113 MMHG

## 2023-09-07 LAB
ANION GAP SERPL CALCULATED.3IONS-SCNC: 6 MMOL/L (ref 3–16)
BUN SERPL-MCNC: 27 MG/DL (ref 7–20)
CALCIUM SERPL-MCNC: 8.7 MG/DL (ref 8.3–10.6)
CHLORIDE SERPL-SCNC: 101 MMOL/L (ref 99–110)
CO2 SERPL-SCNC: 33 MMOL/L (ref 21–32)
CREAT SERPL-MCNC: 0.8 MG/DL (ref 0.6–1.2)
GFR SERPLBLD CREATININE-BSD FMLA CKD-EPI: >60 ML/MIN/{1.73_M2}
GLUCOSE SERPL-MCNC: 104 MG/DL (ref 70–99)
MAGNESIUM SERPL-MCNC: 2 MG/DL (ref 1.8–2.4)
POTASSIUM SERPL-SCNC: 3.9 MMOL/L (ref 3.5–5.1)
SODIUM SERPL-SCNC: 140 MMOL/L (ref 136–145)

## 2023-09-07 PROCEDURE — 6370000000 HC RX 637 (ALT 250 FOR IP): Performed by: NURSE PRACTITIONER

## 2023-09-07 PROCEDURE — 6370000000 HC RX 637 (ALT 250 FOR IP): Performed by: INTERNAL MEDICINE

## 2023-09-07 PROCEDURE — 94761 N-INVAS EAR/PLS OXIMETRY MLT: CPT

## 2023-09-07 PROCEDURE — 99232 SBSQ HOSP IP/OBS MODERATE 35: CPT | Performed by: INTERNAL MEDICINE

## 2023-09-07 PROCEDURE — 94640 AIRWAY INHALATION TREATMENT: CPT

## 2023-09-07 PROCEDURE — 6370000000 HC RX 637 (ALT 250 FOR IP): Performed by: HOSPITALIST

## 2023-09-07 PROCEDURE — 83735 ASSAY OF MAGNESIUM: CPT

## 2023-09-07 PROCEDURE — 36415 COLL VENOUS BLD VENIPUNCTURE: CPT

## 2023-09-07 PROCEDURE — 6360000002 HC RX W HCPCS: Performed by: INTERNAL MEDICINE

## 2023-09-07 PROCEDURE — 2700000000 HC OXYGEN THERAPY PER DAY

## 2023-09-07 PROCEDURE — 2580000003 HC RX 258: Performed by: NURSE PRACTITIONER

## 2023-09-07 PROCEDURE — 80048 BASIC METABOLIC PNL TOTAL CA: CPT

## 2023-09-07 RX ORDER — ATORVASTATIN CALCIUM 40 MG/1
40 TABLET, FILM COATED ORAL NIGHTLY
Qty: 30 TABLET | Refills: 3 | Status: SHIPPED | OUTPATIENT
Start: 2023-09-07

## 2023-09-07 RX ORDER — PREDNISONE 10 MG/1
20 TABLET ORAL DAILY
Qty: 10 TABLET | Refills: 0 | Status: SHIPPED | OUTPATIENT
Start: 2023-09-07 | End: 2023-09-12

## 2023-09-07 RX ORDER — POLYETHYLENE GLYCOL 3350 17 G/17G
17 POWDER, FOR SOLUTION ORAL 2 TIMES DAILY
Status: DISCONTINUED | OUTPATIENT
Start: 2023-09-07 | End: 2023-09-07 | Stop reason: HOSPADM

## 2023-09-07 RX ORDER — LOSARTAN POTASSIUM 50 MG/1
50 TABLET ORAL DAILY
Qty: 30 TABLET | Refills: 3 | Status: SHIPPED | OUTPATIENT
Start: 2023-09-08

## 2023-09-07 RX ORDER — SENNA AND DOCUSATE SODIUM 50; 8.6 MG/1; MG/1
2 TABLET, FILM COATED ORAL 2 TIMES DAILY
Status: DISCONTINUED | OUTPATIENT
Start: 2023-09-07 | End: 2023-09-07 | Stop reason: HOSPADM

## 2023-09-07 RX ORDER — ENOXAPARIN SODIUM 100 MG/ML
40 INJECTION SUBCUTANEOUS DAILY
Status: DISCONTINUED | OUTPATIENT
Start: 2023-09-08 | End: 2023-09-07 | Stop reason: HOSPADM

## 2023-09-07 RX ORDER — AMLODIPINE BESYLATE 5 MG/1
5 TABLET ORAL DAILY
Qty: 30 TABLET | Refills: 3 | Status: SHIPPED | OUTPATIENT
Start: 2023-09-08

## 2023-09-07 RX ADMIN — AMLODIPINE BESYLATE 5 MG: 5 TABLET ORAL at 09:58

## 2023-09-07 RX ADMIN — METOPROLOL TARTRATE 25 MG: 25 TABLET, FILM COATED ORAL at 09:58

## 2023-09-07 RX ADMIN — LOSARTAN POTASSIUM 50 MG: 25 TABLET, FILM COATED ORAL at 09:58

## 2023-09-07 RX ADMIN — TIOTROPIUM BROMIDE AND OLODATEROL 2 PUFF: 3.124; 2.736 SPRAY, METERED RESPIRATORY (INHALATION) at 08:22

## 2023-09-07 RX ADMIN — SENNOSIDES AND DOCUSATE SODIUM 2 TABLET: 50; 8.6 TABLET ORAL at 12:55

## 2023-09-07 RX ADMIN — PANTOPRAZOLE SODIUM 40 MG: 40 TABLET, DELAYED RELEASE ORAL at 06:22

## 2023-09-07 RX ADMIN — ASPIRIN 325 MG: 325 TABLET, COATED ORAL at 09:58

## 2023-09-07 RX ADMIN — SODIUM CHLORIDE, PRESERVATIVE FREE 10 ML: 5 INJECTION INTRAVENOUS at 10:06

## 2023-09-07 RX ADMIN — PREDNISONE 20 MG: 20 TABLET ORAL at 09:58

## 2023-09-07 RX ADMIN — FUROSEMIDE 40 MG: 10 INJECTION, SOLUTION INTRAMUSCULAR; INTRAVENOUS at 10:06

## 2023-09-07 NOTE — CARE COORDINATION
CASE MANAGEMENT DISCHARGE SUMMARY      Discharge to: Home with [facility]    IMM given:9/7/2023    New Durable Medical Equipment ordered/agency:   [employer] for O2, [employer] for rolling walker and bedside commode  Transportation:    Family/car: private     Confirmed discharge plan with:     Patient: yes     Family:  yes per pt    RN, name: Tushar Brooks RN

## 2023-09-07 NOTE — PLAN OF CARE
Patient's EF (Ejection Fraction) is greater than 40%    Heart Failure Medications:  Diuretics[de-identified] Furosemide    (One of the following REQUIRED for EF </= 40%/SYSTOLIC FAILURE but MAY be used in EF% >40%/DIASTOLIC FAILURE)        ACE[de-identified] None        ARB[de-identified] Losartan         ARNI[de-identified] None    (Beta Blockers)  NON- Evidenced Based Beta Blocker (for EF% >40%/DIASTOLIC FAILURE): Metoprolol TARTrate- Lopressor    Evidenced Based Beta Blocker::(REQUIRED for EF% <40%/SYSTOLIC FAILURE) None  . .................................................................................................................................................. Healthy Weight Tracking - BMI + Meds 8/11/2020 9/2/2023 9/3/2023 9/4/2023 9/5/2023 9/6/2023 9/7/2023   Weight 140 lb 145 lb 147 lb 11.3 oz 136 lb 9.6 oz 140 lb 12.8 oz 145 lb 4.8 oz 143 lb 1.6 oz   Height 5' 6\" 5' 6\" - - - - -   Body Mass Index 22.59 kg/m2 23.4 kg/m2 23.84 kg/m2 22.05 kg/m2 22.73 kg/m2 23.45 kg/m2 23.1 kg/m2   Some recent data might be hidden         Patient's weights and intake/output reviewed: Yes    Daily Weight log at bedside, patient/family participation in use of log: \"yes    Patient's Last Weight: 64.9 kg obtained by standing scale. Difference of 1 KGless than last documented weight. Intake/Output Summary (Last 24 hours) at 9/7/2023 0630  Last data filed at 9/6/2023 2111  Gross per 24 hour   Intake 170 ml   Output 650 ml   Net -480 ml     WITH 2 WET DIAPERS  Education Booklet Provided: yes    Comorbidities Reviewed Yes    Patient has a past medical history of Allergic rhinitis and Malignant neoplasm of right kidney ([address]      >>For CHF and Comorbidity documentation on Education Time and Topics, please see Education Tab    Progressive Mobility Assessment:  What is this patient's Current Level of Mobility?: Ambulatory- with Assistance  How was this patient Mobilized today?: Edge of Bed, Up to Chair, Bedside Commode,  Up to Toilet/Shower, Up in Room, Up in [employer],

## 2023-09-07 NOTE — PROGRESS NOTES
Oxygen documentation:    O2 saturation at REST on ROOM AIR = __87____%      O2 saturation with AMBULATION of _20_ feet on ROOM AIR = __87___%  O2 saturation with AMBULATION on 2L/min Nasal cannula= ___92___%

## 2023-09-08 ENCOUNTER — FOLLOWUP TELEPHONE ENCOUNTER (OUTPATIENT)
Dept: TELEMETRY | Age: 73
End: 2023-09-08

## 2023-09-08 NOTE — TELEPHONE ENCOUNTER
3rd and final Attempt; No Answer- Left HIPAA compliant voicemail with Non-Urgent Heart Failure Resource Line number for call back.      Heather Dasilva RN

## 2023-09-08 NOTE — TELEPHONE ENCOUNTER
2nd Attempt; No Answer- Left HIPAA compliant voicemail with Non-Urgent Heart Failure Resource Line number for call back.      Elizabeth hKanna RN

## 2023-09-10 NOTE — PROGRESS NOTES
Physician Progress Note      PATIENT:               Ashley Hoffman  CSN #:                  [id number]  :                       1950  ADMIT DATE:       2023 5:57 PM  [facility] DATE:        2023 3:34 PM  RESPONDING  PROVIDER #:        Keyonna Billings MD          QUERY TEXT:    Patient admitted with elevated troponins, acute HFpEF, and hypertensive   urgency. Noted documentation of Type 2 MI by cardiology  and NSTEMI by   pulmonology . If possible, please document in the progress notes and   discharge summary if you are evaluating and/or treating any of the following: The medical record reflects the following:  Risk Factors: HFpEF, respiratory failure, HTN, COPD  Clinical Indicators: troponins 103/106/75  EKG -  Normal sinus rhythm  Anterior infarct (cited on or before 02-SEP-2023)  T wave abnormality, consider inferior ischemia  Abnormal ECG  Pulmonology -  \"     Patient has acute NSTEMI/diastolic heart failure/elevated BNP/COPD   exacerbation along with hypertensive emergency causing patient to have   symptoms\"  Cardiology -  \" Shortness of breath suspect primary pulmonary, but does have a  component of diastolic congestive heart failure. Elevated troponin,maybe a type 2 MI, need to consider underlying ischemic   heart disease. \"  Stress test -  \"There is nearly uniform isotope uptake at stress and rest. There is no clear  evidence of myocardial ischemia or scar. \"  Cardiology -  \"Elevated troponin: negative stress test for ischemia  No further ischemia evaluation\"  Treatment: Cardiology consult, nitro gtt, nitro paste, oxygen, stress test,   ECHO, serial labs, and supportive care    Thank you,  Laine Priest, RN, BSN, CRCR  Options provided:  -- NSTEMI  -- Type 2 MI  -- Demand ischemia  -- Other - I will add my own diagnosis  -- Disagree - Not applicable / Not valid  -- Disagree - Clinically unable to determine / Unknown  -- Refer to Clinical Documentation

## 2023-09-14 NOTE — PROGRESS NOTES
Physician Progress Note      PATIENT:               Juan Carlos Warner  CSN #:                  [id number]  :                       1950  ADMIT DATE:       2023 5:57 PM  [facility] DATE:        2023 3:34 PM  RESPONDING  PROVIDER #:        Anatoliy Langley MD          QUERY TEXT:    Patient admitted with SOB. Documentation reflects acute on chronic HFpEF. If   possible, please document in the progress notes and discharge summary if acute   on chronic HFpEF was: The medical record reflects the following:  Risk Factors: HFpEF, COPD, HTN  Clinical Indicators: BNP 3459  Cardiology -  \"CHF, acute on chronic diastolic: improved, JVD improved, weight up but net   -3.6 L\"  Treatment: Cardiology consult, IV lasix, ECHO, serial labs, and supportive   care    Thank you,  Ambrosio Issa, RN, BSN, CRCR  Options provided:  -- acute on chronic HFpEF confirmed after study  -- acute on chronic HFpEF ruled out after study  -- Other - I will add my own diagnosis  -- Disagree - Not applicable / Not valid  -- Disagree - Clinically unable to determine / Unknown  -- Refer to Clinical Documentation Reviewer    PROVIDER RESPONSE TEXT:    acute on chronic HFpEF confirmed after study. Query created by: Ambrosio Issa on 2023 7:21 AM      QUERY TEXT:    Patient admitted with acute respiratory failure. Noted documentation of type 2   MI In order to support the diagnosis of type 2 MI, please include additional   clinical indicators in your documentation. Or please document if the   diagnosis of Type 2 MI has been ruled out after further study. The medical record reflects the following:  Risk Factors: HTN, HFpEF, COPD  Clinical Indicators: Troponins 103/106/75  Cardiology -  \"Elevated troponin: negative stress test for ischemia  No further ischemia evaluation\"  Stress test -  \"Summary:Normal LV function.   There is nearly uniform isotope uptake at stress and rest. There is no clear  evidence of myocardial ischemia

## 2023-10-03 ENCOUNTER — HOSPITAL ENCOUNTER (OUTPATIENT)
Dept: PULMONOLOGY | Age: 73
Discharge: HOME | End: 2023-10-03
Payer: MEDICARE

## 2023-10-03 VITALS — OXYGEN SATURATION: 90 %

## 2023-10-03 DIAGNOSIS — J44.9 CHRONIC OBSTRUCTIVE PULMONARY DISEASE, UNSPECIFIED COPD TYPE (HCC): ICD-10-CM

## 2023-10-03 PROCEDURE — 94726 PLETHYSMOGRAPHY LUNG VOLUMES: CPT

## 2023-10-03 PROCEDURE — 94760 N-INVAS EAR/PLS OXIMETRY 1: CPT

## 2023-10-03 PROCEDURE — 94729 DIFFUSING CAPACITY: CPT

## 2023-10-03 PROCEDURE — 94010 BREATHING CAPACITY TEST: CPT

## 2025-05-04 ENCOUNTER — APPOINTMENT (OUTPATIENT)
Dept: GENERAL RADIOLOGY | Age: 75
DRG: 065 | End: 2025-05-04
Payer: MEDICARE

## 2025-05-04 ENCOUNTER — APPOINTMENT (OUTPATIENT)
Dept: CT IMAGING | Age: 75
DRG: 065 | End: 2025-05-04
Payer: MEDICARE

## 2025-05-04 ENCOUNTER — HOSPITAL ENCOUNTER (INPATIENT)
Age: 75
LOS: 5 days | Discharge: HOME | DRG: 065 | End: 2025-05-09
Attending: STUDENT IN AN ORGANIZED HEALTH CARE EDUCATION/TRAINING PROGRAM | Admitting: STUDENT IN AN ORGANIZED HEALTH CARE EDUCATION/TRAINING PROGRAM
Payer: MEDICARE

## 2025-05-04 DIAGNOSIS — I63.9 CEREBROVASCULAR ACCIDENT (CVA), UNSPECIFIED MECHANISM (HCC): Primary | ICD-10-CM

## 2025-05-04 LAB
ALBUMIN SERPL-MCNC: 3.7 G/DL (ref 3.4–5)
ALBUMIN/GLOB SERPL: 1.2 {RATIO} (ref 1.1–2.2)
ALP SERPL-CCNC: 110 U/L (ref 40–129)
ALT SERPL-CCNC: 14 U/L (ref 10–40)
ANION GAP SERPL CALCULATED.3IONS-SCNC: 11 MMOL/L (ref 3–16)
AST SERPL-CCNC: 20 U/L (ref 15–37)
BASOPHILS # BLD: 0.1 K/UL (ref 0–0.2)
BASOPHILS NFR BLD: 1.6 %
BILIRUB SERPL-MCNC: 0.5 MG/DL (ref 0–1)
BUN SERPL-MCNC: 16 MG/DL (ref 7–20)
CALCIUM SERPL-MCNC: 9 MG/DL (ref 8.3–10.6)
CHLORIDE SERPL-SCNC: 105 MMOL/L (ref 99–110)
CO2 SERPL-SCNC: 31 MMOL/L (ref 21–32)
CREAT SERPL-MCNC: 0.9 MG/DL (ref 0.6–1.2)
DEPRECATED RDW RBC AUTO: 14.9 % (ref 12.4–15.4)
EOSINOPHIL # BLD: 0.4 K/UL (ref 0–0.6)
EOSINOPHIL NFR BLD: 5.1 %
GFR SERPLBLD CREATININE-BSD FMLA CKD-EPI: 66 ML/MIN/{1.73_M2}
GLUCOSE SERPL-MCNC: 106 MG/DL (ref 70–99)
HCT VFR BLD AUTO: 46.1 % (ref 36–48)
HGB BLD-MCNC: 15.5 G/DL (ref 12–16)
LYMPHOCYTES # BLD: 1 K/UL (ref 1–5.1)
LYMPHOCYTES NFR BLD: 13.3 %
MCH RBC QN AUTO: 30.6 PG (ref 26–34)
MCHC RBC AUTO-ENTMCNC: 33.5 G/DL (ref 31–36)
MCV RBC AUTO: 91.2 FL (ref 80–100)
MONOCYTES # BLD: 0.6 K/UL (ref 0–1.3)
MONOCYTES NFR BLD: 8.5 %
NEUTROPHILS # BLD: 5.5 K/UL (ref 1.7–7.7)
NEUTROPHILS NFR BLD: 71.5 %
PLATELET # BLD AUTO: 261 K/UL (ref 135–450)
PMV BLD AUTO: 7.6 FL (ref 5–10.5)
POTASSIUM SERPL-SCNC: 4.2 MMOL/L (ref 3.5–5.1)
PROT SERPL-MCNC: 6.7 G/DL (ref 6.4–8.2)
RBC # BLD AUTO: 5.06 M/UL (ref 4–5.2)
SODIUM SERPL-SCNC: 147 MMOL/L (ref 136–145)
TROPONIN, HIGH SENSITIVITY: 23 NG/L (ref 0–14)
TROPONIN, HIGH SENSITIVITY: 23 NG/L (ref 0–14)
WBC # BLD AUTO: 7.6 K/UL (ref 4–11)

## 2025-05-04 PROCEDURE — 6360000004 HC RX CONTRAST MEDICATION: Performed by: STUDENT IN AN ORGANIZED HEALTH CARE EDUCATION/TRAINING PROGRAM

## 2025-05-04 PROCEDURE — 80053 COMPREHEN METABOLIC PANEL: CPT

## 2025-05-04 PROCEDURE — 70450 CT HEAD/BRAIN W/O DYE: CPT

## 2025-05-04 PROCEDURE — 93005 ELECTROCARDIOGRAM TRACING: CPT | Performed by: STUDENT IN AN ORGANIZED HEALTH CARE EDUCATION/TRAINING PROGRAM

## 2025-05-04 PROCEDURE — 2500000003 HC RX 250 WO HCPCS: Performed by: STUDENT IN AN ORGANIZED HEALTH CARE EDUCATION/TRAINING PROGRAM

## 2025-05-04 PROCEDURE — 2000000000 HC ICU R&B

## 2025-05-04 PROCEDURE — 6360000002 HC RX W HCPCS: Performed by: STUDENT IN AN ORGANIZED HEALTH CARE EDUCATION/TRAINING PROGRAM

## 2025-05-04 PROCEDURE — 84484 ASSAY OF TROPONIN QUANT: CPT

## 2025-05-04 PROCEDURE — 85025 COMPLETE CBC W/AUTO DIFF WBC: CPT

## 2025-05-04 PROCEDURE — 2700000000 HC OXYGEN THERAPY PER DAY

## 2025-05-04 PROCEDURE — 99285 EMERGENCY DEPT VISIT HI MDM: CPT

## 2025-05-04 PROCEDURE — 70496 CT ANGIOGRAPHY HEAD: CPT

## 2025-05-04 PROCEDURE — 6370000000 HC RX 637 (ALT 250 FOR IP): Performed by: STUDENT IN AN ORGANIZED HEALTH CARE EDUCATION/TRAINING PROGRAM

## 2025-05-04 PROCEDURE — 71045 X-RAY EXAM CHEST 1 VIEW: CPT

## 2025-05-04 RX ORDER — POLYETHYLENE GLYCOL 3350 17 G/17G
17 POWDER, FOR SOLUTION ORAL DAILY PRN
Status: DISCONTINUED | OUTPATIENT
Start: 2025-05-04 | End: 2025-05-09 | Stop reason: HOSPADM

## 2025-05-04 RX ORDER — ONDANSETRON 2 MG/ML
4 INJECTION INTRAMUSCULAR; INTRAVENOUS EVERY 6 HOURS PRN
Status: DISCONTINUED | OUTPATIENT
Start: 2025-05-04 | End: 2025-05-09 | Stop reason: HOSPADM

## 2025-05-04 RX ORDER — ONDANSETRON 4 MG/1
4 TABLET, ORALLY DISINTEGRATING ORAL EVERY 8 HOURS PRN
Status: DISCONTINUED | OUTPATIENT
Start: 2025-05-04 | End: 2025-05-09 | Stop reason: HOSPADM

## 2025-05-04 RX ORDER — ASPIRIN 300 MG/1
300 SUPPOSITORY RECTAL DAILY
Status: DISCONTINUED | OUTPATIENT
Start: 2025-05-05 | End: 2025-05-07

## 2025-05-04 RX ORDER — SODIUM CHLORIDE 0.9 % (FLUSH) 0.9 %
5-40 SYRINGE (ML) INJECTION EVERY 12 HOURS SCHEDULED
Status: DISCONTINUED | OUTPATIENT
Start: 2025-05-04 | End: 2025-05-09 | Stop reason: HOSPADM

## 2025-05-04 RX ORDER — SODIUM CHLORIDE 9 MG/ML
INJECTION, SOLUTION INTRAVENOUS PRN
Status: DISCONTINUED | OUTPATIENT
Start: 2025-05-04 | End: 2025-05-09 | Stop reason: HOSPADM

## 2025-05-04 RX ORDER — SODIUM CHLORIDE 0.9 % (FLUSH) 0.9 %
5-40 SYRINGE (ML) INJECTION PRN
Status: DISCONTINUED | OUTPATIENT
Start: 2025-05-04 | End: 2025-05-09 | Stop reason: HOSPADM

## 2025-05-04 RX ORDER — ASPIRIN 81 MG/1
81 TABLET, CHEWABLE ORAL DAILY
Status: DISCONTINUED | OUTPATIENT
Start: 2025-05-05 | End: 2025-05-07

## 2025-05-04 RX ORDER — NICARDIPINE HYDROCHLORIDE 0.1 MG/ML
2.5-15 INJECTION INTRAVENOUS CONTINUOUS
Status: DISCONTINUED | OUTPATIENT
Start: 2025-05-04 | End: 2025-05-05

## 2025-05-04 RX ORDER — ATORVASTATIN CALCIUM 40 MG/1
40 TABLET, FILM COATED ORAL NIGHTLY
Status: DISCONTINUED | OUTPATIENT
Start: 2025-05-04 | End: 2025-05-07

## 2025-05-04 RX ORDER — IOPAMIDOL 755 MG/ML
75 INJECTION, SOLUTION INTRAVASCULAR
Status: COMPLETED | OUTPATIENT
Start: 2025-05-04 | End: 2025-05-04

## 2025-05-04 RX ORDER — ENOXAPARIN SODIUM 100 MG/ML
40 INJECTION SUBCUTANEOUS DAILY
Status: DISCONTINUED | OUTPATIENT
Start: 2025-05-05 | End: 2025-05-09 | Stop reason: HOSPADM

## 2025-05-04 RX ADMIN — IOPAMIDOL 75 ML: 755 INJECTION, SOLUTION INTRAVENOUS at 19:45

## 2025-05-04 RX ADMIN — NICARDIPINE HYDROCHLORIDE 5 MG/HR: 0.1 INJECTION INTRAVENOUS at 21:56

## 2025-05-04 RX ADMIN — SODIUM CHLORIDE, PRESERVATIVE FREE 10 ML: 5 INJECTION INTRAVENOUS at 21:57

## 2025-05-04 RX ADMIN — ATORVASTATIN CALCIUM 40 MG: 40 TABLET, FILM COATED ORAL at 21:51

## 2025-05-04 RX ADMIN — NICARDIPINE HYDROCHLORIDE 5 MG/HR: 0.1 INJECTION INTRAVENOUS at 19:15

## 2025-05-04 ASSESSMENT — LIFESTYLE VARIABLES
HOW MANY STANDARD DRINKS CONTAINING ALCOHOL DO YOU HAVE ON A TYPICAL DAY: PATIENT DOES NOT DRINK
HOW OFTEN DO YOU HAVE A DRINK CONTAINING ALCOHOL: NEVER

## 2025-05-04 ASSESSMENT — PAIN - FUNCTIONAL ASSESSMENT: PAIN_FUNCTIONAL_ASSESSMENT: NONE - DENIES PAIN

## 2025-05-04 NOTE — ED PROVIDER NOTES
[facility] CARD TELEMETRY     EMERGENCY DEPARTMENT ENCOUNTER            Pt Name: Nany Almanza   MRN: [id number]   Birthdate 1950   Date of evaluation: 5/4/2025   Provider: Henna Eli MD   PCP: No primary care provider on file.   Note Started: 4:32 PM EDT 5/4/25          CHIEF COMPLAINT     Chief Complaint   Patient presents with    Altered Mental Status     \"Stroke like symptoms x1week.\" Family reports patient has been unable to walk and has difficulty speaking off and on for one week. Patient a&ox4 in triage.      HISTORY OF PRESENT ILLNESS:   History from : Patient and EMS   Limitations to history : expressive aphasia      Nany Almanza is a 74 y.o. female who presents with concerns for 1 week history of expressive aphasia and altered mental status.  EMS states that symptoms have been ongoing for the past week.  She has a difficult time providing history but denies any pain.  Denies any other complaints or concerns.    Nursing Notes were all reviewed and agreed with, or any disagreements were addressed in the HPI.     REVIEW OF SYSTEMS :    Positives and Pertinent negatives as per HPI.      MEDICAL HISTORY   has a past medical history of Allergic rhinitis and Malignant neoplasm of right kidney (HCC).    Past Surgical History:   Procedure Laterality Date    COLONOSCOPY      KIDNEY REMOVAL Right 04/05/2018    DAVINCI RIGHT NEPHRECTOMY                 OTHER SURGICAL HISTORY  late 80s    cone biopsy     TONSILLECTOMY        CURRENTMEDICATIONS       Current Discharge Medication List        CONTINUE these medications which have NOT CHANGED    Details   amLODIPine (NORVASC) 5 MG tablet Take 1 tablet by mouth daily  Qty: 30 tablet, Refills: 3      metoprolol tartrate (LOPRESSOR) 25 MG tablet Take 1 tablet by mouth 2 times daily  Qty: 60 tablet, Refills: 3      losartan (COZAAR) 50 MG tablet Take 1 tablet by mouth daily  Qty: 30 tablet, Refills: 3      atorvastatin (LIPITOR) 40 MG tablet Take 1

## 2025-05-04 NOTE — H&P
[facility] Medicine History & Physical    V 5.1    Date of Admission: 5/4/2025    Date of Service:  Pt seen/examined on 5/4/25     [x]Admitted to Inpatient with expected LOS greater than two midnights due to medical therapy.  []Placed in Observation status.    Chief Admission Complaint: Right-sided weakness and aphasia    Presenting Admission History:      74 y.o. female who presented to [facility] with PMHx significant for COPD, hypertension and hyperlipidemia who presents with worsening aphasia as well as right-sided weakness.  Patient states that her right-sided weakness started approximately 1 week prior to presentation.  She did not tell anyone or seek medical care at that time.  Patient states that her speech is more difficult today.  She was brought in by her grandson after he went to visit and she was not able to answer his questions normally.  Patient also states that she has not taken her blood pressure medications for a number of days.  Patient's CT scan in the ER did show signs of CVA and her blood pressure was uncontrolled with a systolic of than 200..      Assessment/Plan:        CVA -present on CT scan.  Likely occurred approximately 1 week had a presentation.  MRI and CTA ordered on 5/4/25 and pending.  Echo ordered on 5/4/25 and pending.  Neurology consulted and appreciated.  Continue ASA/Statin for 2nd prevention and risk reduction.  PT/OT/SLP ordered.  Family interested in acute rehab    Hypertensive urgency-secondary to noncompliance in the setting of acute CVA.  Blood pressure greater than 200 on presentation.  Initiate Cardene drip with a systolic goal of 160-170 in the first 24 hours.    Hyperlipidemia-- Statin    COPD - without Chronic respiratory failure not on baseline home O2.  Normally controlled on home medication regimen - continued.        Discussed management and the need for Hospitalization of the patient w/ the Emergency Department Provider: Dr. Eli    CT head:

## 2025-05-04 NOTE — ED NOTES
Nany Almanza is a 74 y.o. female admitted for  Principal Problem:    Acute CVA (cerebrovascular accident) (HCC)  Resolved Problems:    * No resolved hospital problems. *  .   Patient Home via EMS transportation with   Chief Complaint   Patient presents with    Altered Mental Status     \"Stroke like symptoms x1week.\" Family reports patient has been unable to walk and has difficulty speaking off and on for one week. Patient a&ox4 in triage.   .  Patient is alert and Person She has difficulty finding words, which makes assessing her orientation difficult.   Patient's baseline mobility: Baseline Mobility: Independent in her home. However, patients grandson stated that she has not been herself and we have not ambulated patient in the ED.   Code Status: Prior   Cardiac Rhythm: Cardiac Rhythm: Sinus rhythm     Is patient on baseline Oxygen: No  how many Liters:   Abnormal Assessment Findings:     Isolation: None      NIH Score:    C-SSRS: Risk of Suicide: No Risk  Bedside swallow:        Active LDA's:    Patient admitted with a cates: no, patient is on a purewick but has not felt the urge to urinate.  If the cates is chronic was it exchanged:NA  Reason for cates: Na  Patient admitted with Central Line:  NA . PICC line placement confirmed: YES OR NO:[id number]}   Reason for Central line: Na  Was central line Inserted from an outside facility: NO       Family/Caregiver Present yes Any Concerns: no   Restraints no  Sitter no         Vitals: MEWS Score: 3    Vitals:    05/04/25 1655 05/04/25 1724 05/04/25 1749 05/04/25 1804   BP: (!) 207/112 (!) 221/119 (!) 203/116 (!) 198/101   Pulse: 89 88 87 84   Resp: 21 19 20 19   Temp:       SpO2:  95% 93% 94%   Weight:       Height:           Last documented pain score (0-10 scale)    Pain medication administered No.    Pertinent or High Risk Medications/Drips: Yes- see MAR.    Pending Blood Product Administration: no    Abnormal labs:   Abnormal Labs Reviewed   COMPREHENSIVE METABOLIC  PANEL W/ REFLEX TO MG FOR LOW K - Abnormal; Notable for the following components:       Result Value    Sodium 147 (*)     Glucose 106 (*)     All other components within normal limits   TROPONIN - Abnormal; Notable for the following components:    Troponin, High Sensitivity 23 (*)     All other components within normal limits   TROPONIN - Abnormal; Notable for the following components:    Troponin, High Sensitivity 23 (*)     All other components within normal limits     Critical values: no  Intervention for critical value(s):     Abnormal Imaging: yes,  See imaging             You may also review the ED PT Care Timeline found under the Summary Tab, ED Encounter Summary, Timeline Reports, ED Patient Care Timeline.     Recommendation    Pending orders/Uncompleted orders to hand off:  UA - Pt reports not feeling like she needs to urinate     Additional Comments: NA     If any further questions, please call Sending RN at [phone]

## 2025-05-05 ENCOUNTER — APPOINTMENT (OUTPATIENT)
Age: 75
DRG: 065 | End: 2025-05-05
Attending: STUDENT IN AN ORGANIZED HEALTH CARE EDUCATION/TRAINING PROGRAM
Payer: MEDICARE

## 2025-05-05 LAB
ANION GAP SERPL CALCULATED.3IONS-SCNC: 8 MMOL/L (ref 3–16)
BUN SERPL-MCNC: 15 MG/DL (ref 7–20)
CALCIUM SERPL-MCNC: 8.6 MG/DL (ref 8.3–10.6)
CHLORIDE SERPL-SCNC: 105 MMOL/L (ref 99–110)
CHOLEST SERPL-MCNC: 171 MG/DL (ref 0–199)
CO2 SERPL-SCNC: 30 MMOL/L (ref 21–32)
CREAT SERPL-MCNC: 1 MG/DL (ref 0.6–1.2)
DEPRECATED RDW RBC AUTO: 14.7 % (ref 12.4–15.4)
ECHO AO ASC DIAM: 3.1 CM
ECHO AO ASCENDING AORTA INDEX: 1.86 CM/M2
ECHO AO ROOT DIAM: 2.8 CM
ECHO AO ROOT INDEX: 1.68 CM/M2
ECHO AV AREA PEAK VELOCITY: 1.6 CM2
ECHO AV AREA VTI: 1.7 CM2
ECHO AV AREA/BSA PEAK VELOCITY: 1 CM2/M2
ECHO AV AREA/BSA VTI: 1 CM2/M2
ECHO AV MEAN GRADIENT: 8 MMHG
ECHO AV MEAN VELOCITY: 1.3 M/S
ECHO AV PEAK GRADIENT: 16 MMHG
ECHO AV PEAK VELOCITY: 2 M/S
ECHO AV VELOCITY RATIO: 0.6
ECHO AV VTI: 37.7 CM
ECHO BSA: 1.66 M2
ECHO EST RA PRESSURE: 8 MMHG
ECHO LA AREA 2C: 20.5 CM2
ECHO LA AREA 4C: 15.1 CM2
ECHO LA DIAMETER INDEX: 2.22 CM/M2
ECHO LA DIAMETER: 3.7 CM
ECHO LA MAJOR AXIS: 5.8 CM
ECHO LA MINOR AXIS: 5.7 CM
ECHO LA TO AORTIC ROOT RATIO: 1.32
ECHO LA VOL BP: 45 ML (ref 22–52)
ECHO LA VOL MOD A2C: 61 ML (ref 22–52)
ECHO LA VOL MOD A4C: 33 ML (ref 22–52)
ECHO LA VOL/BSA BIPLANE: 27 ML/M2 (ref 16–34)
ECHO LA VOLUME INDEX MOD A2C: 37 ML/M2 (ref 16–34)
ECHO LA VOLUME INDEX MOD A4C: 20 ML/M2 (ref 16–34)
ECHO LV E' LATERAL VELOCITY: 3.15 CM/S
ECHO LV E' SEPTAL VELOCITY: 3.59 CM/S
ECHO LV EDV 3D: 117 ML
ECHO LV EDV INDEX 3D: 70 ML/M2
ECHO LV EF PHYSICIAN: 55 %
ECHO LV EJECTION FRACTION 3D: 50 %
ECHO LV ESV 3D: 59 ML
ECHO LV ESV INDEX 3D: 35 ML/M2
ECHO LV FRACTIONAL SHORTENING: 26 % (ref 28–44)
ECHO LV GLOBAL LONGITUDINAL STRAIN (GLS): -13.1 %
ECHO LV INTERNAL DIMENSION DIASTOLE INDEX: 2.57 CM/M2
ECHO LV INTERNAL DIMENSION DIASTOLIC: 4.3 CM (ref 3.9–5.3)
ECHO LV INTERNAL DIMENSION SYSTOLIC INDEX: 1.92 CM/M2
ECHO LV INTERNAL DIMENSION SYSTOLIC: 3.2 CM
ECHO LV IVSD: 1.4 CM (ref 0.6–0.9)
ECHO LV MASS 2D: 232.2 G (ref 67–162)
ECHO LV MASS 3D INDEX: 86.8 G/M2
ECHO LV MASS 3D: 145 G
ECHO LV MASS INDEX 2D: 139.1 G/M2 (ref 43–95)
ECHO LV POSTERIOR WALL DIASTOLIC: 1.4 CM (ref 0.6–0.9)
ECHO LV RELATIVE WALL THICKNESS RATIO: 0.65
ECHO LVOT AREA: 2.8 CM2
ECHO LVOT AV VTI INDEX: 0.59
ECHO LVOT DIAM: 1.9 CM
ECHO LVOT MEAN GRADIENT: 3 MMHG
ECHO LVOT PEAK GRADIENT: 6 MMHG
ECHO LVOT PEAK VELOCITY: 1.2 M/S
ECHO LVOT STROKE VOLUME INDEX: 37.5 ML/M2
ECHO LVOT SV: 62.6 ML
ECHO LVOT VTI: 22.1 CM
ECHO MV A VELOCITY: 1.01 M/S
ECHO MV E DECELERATION TIME (DT): 211 MS
ECHO MV E VELOCITY: 0.57 M/S
ECHO MV E/A RATIO: 0.56
ECHO MV E/E' LATERAL: 18.1
ECHO MV E/E' RATIO (AVERAGED): 16.99
ECHO MV E/E' SEPTAL: 15.88
ECHO PV MAX VELOCITY: 1.2 M/S
ECHO PV PEAK GRADIENT: 6 MMHG
ECHO RA AREA 4C: 15.6 CM2
ECHO RA END SYSTOLIC VOLUME APICAL 4 CHAMBER INDEX BSA: 25 ML/M2
ECHO RA VOLUME: 41 ML
ECHO RIGHT VENTRICULAR SYSTOLIC PRESSURE (RVSP): 61 MMHG
ECHO RV BASAL DIMENSION: 3.8 CM
ECHO RV FREE WALL PEAK S': 12.8 CM/S
ECHO RV LONGITUDINAL DIMENSION: 7.3 CM
ECHO RV MID DIMENSION: 3.7 CM
ECHO RV TAPSE: 2.2 CM (ref 1.7–?)
ECHO TV REGURGITANT MAX VELOCITY: 3.65 M/S
ECHO TV REGURGITANT PEAK GRADIENT: 53 MMHG
EKG ATRIAL RATE: 88 BPM
EKG DIAGNOSIS: NORMAL
EKG P AXIS: 62 DEGREES
EKG P-R INTERVAL: 156 MS
EKG Q-T INTERVAL: 384 MS
EKG QRS DURATION: 92 MS
EKG QTC CALCULATION (BAZETT): 464 MS
EKG R AXIS: -13 DEGREES
EKG T AXIS: 31 DEGREES
EKG VENTRICULAR RATE: 88 BPM
GFR SERPLBLD CREATININE-BSD FMLA CKD-EPI: 58 ML/MIN/{1.73_M2}
GLUCOSE SERPL-MCNC: 109 MG/DL (ref 70–99)
HCT VFR BLD AUTO: 42.8 % (ref 36–48)
HDLC SERPL-MCNC: 45 MG/DL (ref 40–60)
HGB BLD-MCNC: 14.4 G/DL (ref 12–16)
LDLC SERPL CALC-MCNC: 113 MG/DL
MCH RBC QN AUTO: 31 PG (ref 26–34)
MCHC RBC AUTO-ENTMCNC: 33.7 G/DL (ref 31–36)
MCV RBC AUTO: 92 FL (ref 80–100)
PLATELET # BLD AUTO: 248 K/UL (ref 135–450)
PMV BLD AUTO: 7.7 FL (ref 5–10.5)
POTASSIUM SERPL-SCNC: 3.8 MMOL/L (ref 3.5–5.1)
RBC # BLD AUTO: 4.65 M/UL (ref 4–5.2)
SODIUM SERPL-SCNC: 143 MMOL/L (ref 136–145)
TRIGL SERPL-MCNC: 63 MG/DL (ref 0–150)
VLDLC SERPL CALC-MCNC: 13 MG/DL
WBC # BLD AUTO: 9 K/UL (ref 4–11)

## 2025-05-05 PROCEDURE — 93356 MYOCRD STRAIN IMG SPCKL TRCK: CPT | Performed by: INTERNAL MEDICINE

## 2025-05-05 PROCEDURE — 93010 ELECTROCARDIOGRAM REPORT: CPT | Performed by: INTERNAL MEDICINE

## 2025-05-05 PROCEDURE — 6360000002 HC RX W HCPCS: Performed by: STUDENT IN AN ORGANIZED HEALTH CARE EDUCATION/TRAINING PROGRAM

## 2025-05-05 PROCEDURE — 6370000000 HC RX 637 (ALT 250 FOR IP): Performed by: INTERNAL MEDICINE

## 2025-05-05 PROCEDURE — 2500000003 HC RX 250 WO HCPCS: Performed by: STUDENT IN AN ORGANIZED HEALTH CARE EDUCATION/TRAINING PROGRAM

## 2025-05-05 PROCEDURE — 6370000000 HC RX 637 (ALT 250 FOR IP): Performed by: STUDENT IN AN ORGANIZED HEALTH CARE EDUCATION/TRAINING PROGRAM

## 2025-05-05 PROCEDURE — 2000000000 HC ICU R&B

## 2025-05-05 PROCEDURE — 93306 TTE W/DOPPLER COMPLETE: CPT | Performed by: INTERNAL MEDICINE

## 2025-05-05 PROCEDURE — 92610 EVALUATE SWALLOWING FUNCTION: CPT

## 2025-05-05 PROCEDURE — 94761 N-INVAS EAR/PLS OXIMETRY MLT: CPT

## 2025-05-05 PROCEDURE — 6370000000 HC RX 637 (ALT 250 FOR IP): Performed by: NEUROMUSCULOSKELETAL MEDICINE & OMM

## 2025-05-05 PROCEDURE — 99222 1ST HOSP IP/OBS MODERATE 55: CPT | Performed by: NEUROMUSCULOSKELETAL MEDICINE & OMM

## 2025-05-05 PROCEDURE — APPSS45 APP SPLIT SHARED TIME 31-45 MINUTES

## 2025-05-05 PROCEDURE — 2580000003 HC RX 258: Performed by: INTERNAL MEDICINE

## 2025-05-05 PROCEDURE — 36415 COLL VENOUS BLD VENIPUNCTURE: CPT

## 2025-05-05 PROCEDURE — 80061 LIPID PANEL: CPT

## 2025-05-05 PROCEDURE — 92526 ORAL FUNCTION THERAPY: CPT

## 2025-05-05 PROCEDURE — 85027 COMPLETE CBC AUTOMATED: CPT

## 2025-05-05 PROCEDURE — 6360000002 HC RX W HCPCS: Performed by: INTERNAL MEDICINE

## 2025-05-05 PROCEDURE — 80048 BASIC METABOLIC PNL TOTAL CA: CPT

## 2025-05-05 PROCEDURE — 93306 TTE W/DOPPLER COMPLETE: CPT

## 2025-05-05 PROCEDURE — 2700000000 HC OXYGEN THERAPY PER DAY

## 2025-05-05 PROCEDURE — 83036 HEMOGLOBIN GLYCOSYLATED A1C: CPT

## 2025-05-05 PROCEDURE — 76376 3D RENDER W/INTRP POSTPROCES: CPT | Performed by: INTERNAL MEDICINE

## 2025-05-05 PROCEDURE — 96125 COGNITIVE TEST BY HC PRO: CPT

## 2025-05-05 RX ORDER — NICARDIPINE HYDROCHLORIDE 0.1 MG/ML
2.5-15 INJECTION INTRAVENOUS CONTINUOUS
Status: DISCONTINUED | OUTPATIENT
Start: 2025-05-05 | End: 2025-05-05 | Stop reason: SDUPTHER

## 2025-05-05 RX ORDER — LOSARTAN POTASSIUM 25 MG/1
12.5 TABLET ORAL DAILY
Status: DISCONTINUED | OUTPATIENT
Start: 2025-05-05 | End: 2025-05-05

## 2025-05-05 RX ORDER — LOSARTAN POTASSIUM 25 MG/1
37.5 TABLET ORAL ONCE
Status: COMPLETED | OUTPATIENT
Start: 2025-05-05 | End: 2025-05-05

## 2025-05-05 RX ORDER — CLOPIDOGREL BISULFATE 75 MG/1
75 TABLET ORAL DAILY
Status: DISCONTINUED | OUTPATIENT
Start: 2025-05-06 | End: 2025-05-07

## 2025-05-05 RX ORDER — METOPROLOL SUCCINATE 25 MG/1
25 TABLET, EXTENDED RELEASE ORAL 2 TIMES DAILY
Status: DISCONTINUED | OUTPATIENT
Start: 2025-05-05 | End: 2025-05-05

## 2025-05-05 RX ORDER — LOSARTAN POTASSIUM 25 MG/1
25 TABLET ORAL DAILY
Status: DISCONTINUED | OUTPATIENT
Start: 2025-05-05 | End: 2025-05-05

## 2025-05-05 RX ORDER — METOPROLOL SUCCINATE 25 MG/1
12.5 TABLET, EXTENDED RELEASE ORAL 2 TIMES DAILY
Status: DISCONTINUED | OUTPATIENT
Start: 2025-05-05 | End: 2025-05-07

## 2025-05-05 RX ORDER — LOSARTAN POTASSIUM 25 MG/1
50 TABLET ORAL DAILY
Status: DISCONTINUED | OUTPATIENT
Start: 2025-05-06 | End: 2025-05-09 | Stop reason: HOSPADM

## 2025-05-05 RX ORDER — CLOPIDOGREL BISULFATE 75 MG/1
75 TABLET ORAL DAILY
Status: DISCONTINUED | OUTPATIENT
Start: 2025-05-05 | End: 2025-05-05

## 2025-05-05 RX ORDER — MUPIROCIN 20 MG/G
OINTMENT TOPICAL 2 TIMES DAILY
Status: DISCONTINUED | OUTPATIENT
Start: 2025-05-05 | End: 2025-05-08

## 2025-05-05 RX ORDER — NICOTINE 21 MG/24HR
1 PATCH, TRANSDERMAL 24 HOURS TRANSDERMAL DAILY
Status: DISCONTINUED | OUTPATIENT
Start: 2025-05-05 | End: 2025-05-09 | Stop reason: HOSPADM

## 2025-05-05 RX ADMIN — NICARDIPINE HYDROCHLORIDE 5 MG/HR: 0.1 INJECTION INTRAVENOUS at 14:19

## 2025-05-05 RX ADMIN — MUPIROCIN: 20 OINTMENT TOPICAL at 12:13

## 2025-05-05 RX ADMIN — MUPIROCIN: 20 OINTMENT TOPICAL at 20:39

## 2025-05-05 RX ADMIN — NICARDIPINE HYDROCHLORIDE 2.5 MG/HR: 25 INJECTION INTRAVENOUS at 23:46

## 2025-05-05 RX ADMIN — LOSARTAN POTASSIUM 37.5 MG: 25 TABLET, FILM COATED ORAL at 17:21

## 2025-05-05 RX ADMIN — METOPROLOL SUCCINATE 12.5 MG: 25 TABLET, EXTENDED RELEASE ORAL at 12:13

## 2025-05-05 RX ADMIN — NICARDIPINE HYDROCHLORIDE 5 MG/HR: 25 INJECTION INTRAVENOUS at 18:25

## 2025-05-05 RX ADMIN — METOPROLOL SUCCINATE 12.5 MG: 25 TABLET, EXTENDED RELEASE ORAL at 20:38

## 2025-05-05 RX ADMIN — ENOXAPARIN SODIUM 40 MG: 100 INJECTION SUBCUTANEOUS at 09:17

## 2025-05-05 RX ADMIN — CLOPIDOGREL BISULFATE 75 MG: 75 TABLET, FILM COATED ORAL at 09:29

## 2025-05-05 RX ADMIN — NICARDIPINE HYDROCHLORIDE 5 MG/HR: 0.1 INJECTION INTRAVENOUS at 09:18

## 2025-05-05 RX ADMIN — ONDANSETRON 4 MG: 4 TABLET, ORALLY DISINTEGRATING ORAL at 14:16

## 2025-05-05 RX ADMIN — ATORVASTATIN CALCIUM 40 MG: 40 TABLET, FILM COATED ORAL at 20:38

## 2025-05-05 RX ADMIN — ASPIRIN 81 MG: 81 TABLET, CHEWABLE ORAL at 09:17

## 2025-05-05 RX ADMIN — LOSARTAN POTASSIUM 12.5 MG: 25 TABLET, FILM COATED ORAL at 10:14

## 2025-05-05 RX ADMIN — SODIUM CHLORIDE, PRESERVATIVE FREE 10 ML: 5 INJECTION INTRAVENOUS at 20:38

## 2025-05-05 NOTE — PROGRESS NOTES
Occupational Therapy/ Physical Therapy    Orders received and chart review completed - RN cleared pt for OT/PT eval. Upon arrival pt with /119 while seated in bed at rest - unsafe to completed activity at this time. RN made aware. Will hold therapy evaluations at this time. Will follow up as schedule allows/ as pt's medical status allows.     Adry Schulz, OTR/L  Irish Alfonso, PT, DPT

## 2025-05-05 NOTE — PROGRESS NOTES
Met with Pt's daughter at bedside. Pt wanting rest. Listened and offered emotional support to daughter. Daughter tearful and expressed worry about Pt.  acknowledged and affirmed her emotions. Prayed with daughter and Pt for Pt's care and their support.    Peter Issa

## 2025-05-05 NOTE — ACP (ADVANCE CARE PLANNING)
Advance Care Planning   Advance Care Planning Clinical Specialist  Conversation Note      Date of ACP Conversation: 5/5/2025    Conversation Conducted with:   Patient with Decision Making Capacity   Healthcare Decision Maker: Next of Kin by law (only applies in absence of above) as below    ACP Clinical Specialist: Diane Swift, RN      *When Decision Maker makes decisions on behalf of the incapacitated patient: Decision Maker is asked to consider and make decisions based on patient values, known preferences, or best interests.       Current Designated Health Care Decision Maker:   Primary Decision Maker: Liya Almanza - Child - [phone]    Primary Decision Maker: MarcellaThea - Child - [phone]    Secondary Decision Maker: Warren Naranjo - Child  [phone]  (as entered in ACP Activity Healthcare Decision Maker field.  Validate  this information as still accurate & up-to-date; edit Healthcare Decision Maker field as needed.)   For below questions, when conducting conversation with Health Care Decision Maker, substitute \"she\" and \"her\" for \"you\" and \"your\".      Hospitalization  If your health were to worsen and it became clear that your chance of recovery was unlikely, what would your preferences be regarding hospitalization?:    Choice:  [x]  The patient would want hospitalization  []  The patient would prefer comfort-focused treatment without hospitalization.    Ventilation  If you were in your present state of health and suddenly became very ill and were unable to breathe on your own, what would your preference be about the use of a ventilator (breathing machine) if it were available to you?      If patient would desire the use of a ventilator (breathing machine), answer \"yes\", if not \"no\":yes    If your health were to worsen and it became clear that your chance of recovery was unlikely, would that change your answer? Yes    Resuscitation  CPR works best to restart the heart when there is a sudden

## 2025-05-05 NOTE — PLAN OF CARE
Problem: Chronic Conditions and Co-morbidities  Goal: Patient's chronic conditions and co-morbidity symptoms are monitored and maintained or improved  Outcome: Progressing  Flowsheets (Taken 5/5/2025 1218)  Care Plan - Patient's Chronic Conditions and Co-Morbidity Symptoms are Monitored and Maintained or Improved:   Monitor and assess patient's chronic conditions and comorbid symptoms for stability, deterioration, or improvement   Collaborate with multidisciplinary team to address chronic and comorbid conditions and prevent exacerbation or deterioration   Update acute care plan with appropriate goals if chronic or comorbid symptoms are exacerbated and prevent overall improvement and discharge     Problem: Discharge Planning  Goal: Discharge to home or other facility with appropriate resources  Outcome: Progressing  Flowsheets (Taken 5/5/2025 1218)  Discharge to home or other facility with appropriate resources:   Identify barriers to discharge with patient and caregiver   Arrange for needed discharge resources and transportation as appropriate   Identify discharge learning needs (meds, wound care, etc)   Refer to discharge planning if patient needs post-hospital services based on physician order or complex needs related to functional status, cognitive ability or social support system     Problem: Skin/Tissue Integrity  Goal: Skin integrity remains intact  Description: 1.  Monitor for areas of redness and/or skin breakdown2.  Assess vascular access sites hourly3.  Every 4-6 hours minimum:  Change oxygen saturation probe site4.  Every 4-6 hours:  If on nasal continuous positive airway pressure, respiratory therapy assess nares and determine need for appliance change or resting period  Outcome: Progressing  Flowsheets (Taken 5/5/2025 1218)  Skin Integrity Remains Intact:   Monitor for areas of redness and/or skin breakdown   Every 4-6 hours minimum:  Change oxygen saturation probe site   Every 4-6 hours:  If on nasal

## 2025-05-05 NOTE — CARE COORDINATION
Case Management Assessment  Initial Evaluation    Date/Time of Evaluation: 5/5/2025 3:56 PM  Assessment Completed by: Diane Swift RN    If patient is discharged prior to next notation, then this note serves as note for discharge by case management.    Patient Name: Nany Almanza                   YOB: 1950  Diagnosis: Acute CVA (cerebrovascular accident) (HCC) [I63.9]  Cerebrovascular accident (CVA), unspecified mechanism (HCC) [I63.9]                   Date / Time: 5/4/2025  4:28 PM    Patient Admission Status: Inpatient   Readmission Risk (Low < 19, Mod (19-27), High > 27): Readmission Risk Score: 11.9    Current PCP: No primary care provider on file.  PCP verified by CM? No (no PCP)    Chart Reviewed: Yes      History Provided by: Child/Family (daughter Thea)  Patient Orientation: Other (see comment) (sleeping soundly - daughter at bedside)    Patient Cognition:      Hospitalization in the last 30 days (Readmission):  No    If yes, Readmission Assessment in  Navigator will be completed.    Advance Directives:      Code Status: Full Code   Patient's Primary Decision Maker is: Patient Declined (Legal Next of Kin Remains as Decision Maker)    Supplemental (Other) Decision Maker: Warren Naranjo - Child - [phone]    Discharge Planning:    Patient lives with: Children Type of Home: House  Primary Care Giver: Self  Patient Support Systems include: Family Members, Children   Current Financial resources: Medicare  Current community resources: None  Current services prior to admission: Oxygen Therapy, Home Care ([facility] O2 provider   [facility] RN,PT,OT)            Current DME:              Type of Home Care services:  OT, PT, Skilled Therapy    ADLS  Prior functional level: Assistance with the following:, Housework, Shopping  Current functional level: Assistance with the following:, Bathing, Dressing, Toileting    PT AM-PAC:   /24  OT AM-PAC:   /24    Family can provide assistance at DC: Yes  Would

## 2025-05-05 NOTE — PROGRESS NOTES
Shift: [id number]    Reason for ICU Admission: right sided weakness and expressive aphasia     Most recent vitals: BP (!) 168/86   Pulse 77   Temp 98.3 °F (36.8 °C) (Oral)   Resp 19   Ht 1.676 m (5' 6\")   Wt 59.8 kg (131 lb 13.4 oz)   SpO2 96%   BMI 21.28 kg/m²      Drip rates at handoff:    niCARdipine Stopped (05/04/25 2332)    sodium chloride         Current O2:   [] Room Air   [x] NC  L   [] BiPaP    [] Vented  % Fi02,  Peep    Hospital Course:  ICU Day # 1 (5/4/25)   -CT head confirmed recent infarct, mild symptoms   -MRI and echo ordered  -SBP was in the 200s in the ED, Cardene gtt was started and quickly weaned off after arriving to ICU  (goal -170)  -bed side swallow evaluation passed and diet ordered, cardiac medications are updated in med rec and need ordered

## 2025-05-05 NOTE — PROGRESS NOTES
Hospital Medicine Progress Note  V 5.1      Date of Admission: 5/4/2025    Hospital Day: 2      Chief Admission Complaint:  rt sided weakness    Subjective:  resting in bed, daughter at bedside, bp remains labile, currently of nicardipine ggt, still notes rt sided weakness, speech is improved    Presenting Admission History:         74 y.o. female who presented to [facility] with PMHx significant for COPD, hypertension and hyperlipidemia who presents with worsening aphasia as well as right-sided weakness.  Patient states that her right-sided weakness started approximately 1 week prior to presentation.  She did not tell anyone or seek medical care at that time.  Patient states that her speech is more difficult today.  She was brought in by her grandson after he went to visit and she was not able to answer his questions normally.  Patient also states that she has not taken her blood pressure medications for a number of days.  Patient's CT scan in the ER did show signs of CVA and her blood pressure was uncontrolled with a systolic of than 200..       Assessment/Plan:           CVA -present on CT scan.  Likely occurred approximately 1 week had a presentation. CTA ordered on 5/4/25(ntoed age indeterm infarcts to left parieto-occip lobe complex, left parietal lobe, medial rt occip lobe, <30% stenosis of prox bilat ICA, mild to mod stenosis of rt vert, prox left MCA M1 segment, severe of distal M1 segment)   MRI brain pending   Echo ordered on 5/4/25 and pending.    Neurology consulted and appreciated.    Continue ASA/Statin for 2nd prevention and risk reduction.  PT/OT/SLP ordered.  Family was interested in acute rehab     Hypertensive urgency-secondary to noncompliance in the setting of acute CVA.  Blood pressure greater than 200 on presentation.  Initiate Cardene drip with a systolic goal of 160-170 in the first 24 hours.   -reordered low dose home meds 5/5    Hyperlipidemia-- Statin     COPD -

## 2025-05-05 NOTE — PLAN OF CARE
SLP completed evaluation. Please refer to notes in EMR.      Thank you,   Denice Carter M.A. CCC-SLP   Speech-Language Pathologist

## 2025-05-05 NOTE — PROGRESS NOTES
Shift: [id number]    Reason for ICU Admission: right sided weakness and expressive aphasia     Most recent vitals: BP (!) 185/93   Pulse 83   Temp 97.9 °F (36.6 °C) (Oral)   Resp 20   Ht 1.676 m (5' 6\")   Wt 59.4 kg (131 lb)   SpO2 93%   BMI 21.14 kg/m²      Drip rates at handoff:    niCARdipine 5 mg/hr (05/05/25 1825)    sodium chloride         Current O2:   [] Room Air   [x] NC  L   [] BiPaP    [] Vented  % Fi02,  Peep    Hospital Course:  ICU Day # 1 (5/4/25)   -CT head confirmed recent infarct, mild symptoms   -MRI and echo ordered  -SBP was in the 200s in the ED, Cardene gtt was started and quickly weaned off after arriving to ICU  (goal -170)  -bed side swallow evaluation passed and diet ordered, cardiac medications are updated in med rec and need ordered    Hospital Day #2 (5/5/2025)  - Nicardipine restarted   - Home BP meds ordered

## 2025-05-05 NOTE — PROGRESS NOTES
Speech Language Pathology  Clinical Bedside Swallow Assessment  Speech/Language/Cognitive Assessment   Facility/Department: [facility] C2 CARD TELEMETRY        Recommendations:  Diet recommendation: IDDSI 7 Regular Solids; IDDSI 0 Thin Liquids; Meds PO as tolerated  Instrumentation: Not clinically indicated at this time. Will continue to monitor  Risk management: upright for all intake, stay upright for at least 30 mins after intake, small bites/sips, general GERD precautions, general aspiration precautions, and hold PO and contact SLP if s/s of aspiration or worsening respiratory status develop.  Prognosis: Fair    Barriers to home discharge:  [x] inability to manage medications, will need assist (pt admitted to mismanagement of BP meds PTA)  [x] inability to manage finances, will need assist  [x] inability to effectively communicate in emergent situations (ex: calling 911, stating name, anomia, etc)  [x] severity of cognition (mod-severe reasoning/executive function/registration) with reduced insight negatively impacting safety/independence  [x] Right neglect       NAME:Nany Almanza  : 1950 (74 y.o.)   MRN: [id number]  ROOM: [facility]0239-  ADMISSION DATE: 2025  PATIENT DIAGNOSIS(ES): Acute CVA (cerebrovascular accident) ([facility]) [I63.9]  Cerebrovascular accident (CVA), unspecified mechanism (HCC) [I63.9]  Chief Complaint   Patient presents with    Altered Mental Status     \"Stroke like symptoms x1week.\" Family reports patient has been unable to walk and has difficulty speaking off and on for one week. Patient a&ox4 in triage.     Patient Active Problem List    Diagnosis Date Noted    Acute CVA (cerebrovascular accident) ([facility]) 2025    Acute and chronic respiratory failure with hypoxia ([facility]) 2023    Hypercarbia 2023    Pulmonary HTN (HCC) 2023    SIRS (systemic inflammatory response syndrome) ([facility]) 2023    Hypertensive emergency 2023    Mass of upper lobe of right lung

## 2025-05-05 NOTE — CONSULTS
Nany Almanza  Inpatient Neurology Consult  [facility]            Nany Almanza  1950    Date of Service: 5/5/2025    Referring Physician: Kamlesh Recinos MD      Reason for the consult and CC: CVA    HPI:   The patient is a 74 y.o.  years old female with a prior medical history of COPD, hypertension, hyperlipidemia who presented to the hospital with aphasia and right-sided weakness.  Patient reportedly began experiencing symptoms 1 week prior and was brought to the hospital after her grandson came to visit and noticed that she was not able to answer his questions.  Patient is somewhat of a poor historian, chart review states that she did not take her medications for several days however she tells me that she has not taken any of her medications in 2 years.  CT head revealed recent infarct in left posterior parietal lobe.  CTA revealed left parietal occipital, left parietal, medial right occipital age-indeterminate infarcts.  CTA head/neck revealed mild to moderate left MCA stenosis.       Constitutional:   Vitals:    05/05/25 1213 05/05/25 1218 05/05/25 1254 05/05/25 1300   BP: (!) 157/91  (!) 157/91 (!) 153/82   Pulse: 85 86  85   Resp:    25   Temp:    98.4 °F (36.9 °C)   TempSrc:    Oral   SpO2:    92%   Weight:   59.4 kg (131 lb)    Height:   1.676 m (5' 6\")          I personally reviewed and updated social history, past medical history, medications, allergy, surgical history, and family history as documented in the patient's electronic health records.       ROS: 10-14 ROS reviewed with the patient/nurse/family which were unremarkable except mentioned in H&P.    General appearance:  Normal development and appear in no acute distress.   Mental Status:   Oriented to person, place, problem, and time.    Memory: Poor recall for recent events.  Poor attention span and concentration.  Language: Mild expressive aphasia/effortful speech. Speech is clear.   Poor fund of Knowledge.   Cranial

## 2025-05-05 NOTE — PROGRESS NOTES
4 Eyes Skin Assessment     The patient is being assess for  Admission    I agree that 2 RN's have performed a thorough Head to Toe Skin Assessment on the patient. ALL assessment sites listed below have been assessed.       Areas assessed by both nurses:   [x]   Head, Face, and Ears   [x]   Shoulders, Back, and Chest  [x]   Arms, Elbows, and Hands   [x]   Coccyx, Sacrum, and Ischum  [x]   Legs, Feet, and Heels        Does the Patient have Skin Breakdown?  No         Lorenzo Prevention initiated:  Yes   Wound Care Orders initiated:  NA      [facility] nurse consulted for Pressure Injury (Stage 3,4, Unstageable, DTI, NWPT, and Complex wounds):  NA      Nurse 1 eSignature: Electronically signed by Jeannette Meléndez RN on 5/4/25 at 9:29 PM EDT    **SHARE this note so that the co-signing nurse is able to place an eSignature**    Nurse 2 eSignature: {Esignature:[id number]}

## 2025-05-05 NOTE — PROGRESS NOTES
Paged for HTN.  Reviewed neuro note.  Increased ARB to home dose.  Changed goal of nicardipine gtt.  Attending will probably increase back to her PO regimen tomorrow.

## 2025-05-06 ENCOUNTER — APPOINTMENT (OUTPATIENT)
Dept: MRI IMAGING | Age: 75
DRG: 065 | End: 2025-05-06
Payer: MEDICARE

## 2025-05-06 LAB
EST. AVERAGE GLUCOSE BLD GHB EST-MCNC: 114 MG/DL
HBA1C MFR BLD: 5.6 %

## 2025-05-06 PROCEDURE — 6370000000 HC RX 637 (ALT 250 FOR IP): Performed by: INTERNAL MEDICINE

## 2025-05-06 PROCEDURE — 6360000002 HC RX W HCPCS: Performed by: INTERNAL MEDICINE

## 2025-05-06 PROCEDURE — 6370000000 HC RX 637 (ALT 250 FOR IP)

## 2025-05-06 PROCEDURE — APPNB30 APP NON BILLABLE TIME 0-30 MINS

## 2025-05-06 PROCEDURE — 6360000002 HC RX W HCPCS: Performed by: STUDENT IN AN ORGANIZED HEALTH CARE EDUCATION/TRAINING PROGRAM

## 2025-05-06 PROCEDURE — 2500000003 HC RX 250 WO HCPCS: Performed by: STUDENT IN AN ORGANIZED HEALTH CARE EDUCATION/TRAINING PROGRAM

## 2025-05-06 PROCEDURE — 70551 MRI BRAIN STEM W/O DYE: CPT

## 2025-05-06 PROCEDURE — 2700000000 HC OXYGEN THERAPY PER DAY

## 2025-05-06 PROCEDURE — 2000000000 HC ICU R&B

## 2025-05-06 PROCEDURE — 94761 N-INVAS EAR/PLS OXIMETRY MLT: CPT

## 2025-05-06 PROCEDURE — 6370000000 HC RX 637 (ALT 250 FOR IP): Performed by: STUDENT IN AN ORGANIZED HEALTH CARE EDUCATION/TRAINING PROGRAM

## 2025-05-06 RX ORDER — LORAZEPAM 2 MG/ML
0.5 INJECTION INTRAMUSCULAR ONCE
Status: COMPLETED | OUTPATIENT
Start: 2025-05-06 | End: 2025-05-06

## 2025-05-06 RX ADMIN — METOPROLOL SUCCINATE 12.5 MG: 25 TABLET, EXTENDED RELEASE ORAL at 08:24

## 2025-05-06 RX ADMIN — METOPROLOL SUCCINATE 12.5 MG: 25 TABLET, EXTENDED RELEASE ORAL at 21:17

## 2025-05-06 RX ADMIN — ENOXAPARIN SODIUM 40 MG: 100 INJECTION SUBCUTANEOUS at 08:24

## 2025-05-06 RX ADMIN — LORAZEPAM 0.5 MG: 2 INJECTION INTRAMUSCULAR; INTRAVENOUS at 12:31

## 2025-05-06 RX ADMIN — SODIUM CHLORIDE, PRESERVATIVE FREE 10 ML: 5 INJECTION INTRAVENOUS at 08:24

## 2025-05-06 RX ADMIN — LOSARTAN POTASSIUM 50 MG: 25 TABLET, FILM COATED ORAL at 08:24

## 2025-05-06 RX ADMIN — MUPIROCIN: 20 OINTMENT TOPICAL at 21:18

## 2025-05-06 RX ADMIN — ATORVASTATIN CALCIUM 40 MG: 40 TABLET, FILM COATED ORAL at 21:18

## 2025-05-06 RX ADMIN — MUPIROCIN: 20 OINTMENT TOPICAL at 08:24

## 2025-05-06 RX ADMIN — CLOPIDOGREL BISULFATE 75 MG: 75 TABLET, FILM COATED ORAL at 08:24

## 2025-05-06 RX ADMIN — ASPIRIN 81 MG: 81 TABLET, CHEWABLE ORAL at 08:24

## 2025-05-06 RX ADMIN — SODIUM CHLORIDE, PRESERVATIVE FREE 10 ML: 5 INJECTION INTRAVENOUS at 21:18

## 2025-05-06 NOTE — PROGRESS NOTES
Shift: [id number]    Reason for ICU Admission: right sided weakness and expressive aphasia     Most recent vitals: BP (!) 157/75   Pulse 60   Temp 98.6 °F (37 °C) (Oral)   Resp 17   Ht 1.676 m (5' 6\")   Wt 59.4 kg (131 lb)   SpO2 96%   BMI 21.14 kg/m²      Drip rates at handoff:    niCARdipine Stopped (05/06/25 0400)    sodium chloride         Current O2:   [] Room Air   [x] NC  L   [] BiPaP    [] Vented  % Fi02,  Peep    Hospital Course:  ICU Day # 1 (5/4/25)   -CT head confirmed recent infarct, mild symptoms   -MRI and echo ordered  -SBP was in the 200s in the ED, Cardene gtt was started and quickly weaned off after arriving to ICU  (goal -170)  -bed side swallow evaluation passed and diet ordered, cardiac medications are updated in med rec and need ordered    Hospital Day #2 (5/5/2025)  - Nicardipine restarted   - Home BP meds ordered    Hospital day 2 (nights)  -no change to NIHSS  -MRI today  -one episode of emesis last pm (phlegm)  -right hand swollen IV removed placed in left wrist  -Cardene off this am    ICU Day 3 (5/6/25)  -No change to NIHSS  -MRI obtained  -cardene stayed off

## 2025-05-06 NOTE — PROGRESS NOTES
Physical Therapy/Occupational Therapy      Therapy orders received & chart reviewed. Attempted to see pt this afternoon. Pt medical hold due to just returned from MRI with Ativan. Pt resting with family at bedside.   Will re-attempt when medically appropriate    Jaycee Pereira, PT  Adry Schulz, OTR/L

## 2025-05-06 NOTE — PROGRESS NOTES
Shift: [id number]    Reason for ICU Admission: right sided weakness and expressive aphasia     Most recent vitals: BP (!) 151/77   Pulse 66   Temp 98.4 °F (36.9 °C) (Oral)   Resp 15   Ht 1.676 m (5' 6\")   Wt 59.4 kg (131 lb)   SpO2 92%   BMI 21.14 kg/m²      Drip rates at handoff:    niCARdipine Stopped (05/06/25 0400)    sodium chloride         Current O2:   [] Room Air   [x] NC  L   [] BiPaP    [] Vented  % Fi02,  Peep    Hospital Course:  ICU Day # 1 (5/4/25)   -CT head confirmed recent infarct, mild symptoms   -MRI and echo ordered  -SBP was in the 200s in the ED, Cardene gtt was started and quickly weaned off after arriving to ICU  (goal -170)  -bed side swallow evaluation passed and diet ordered, cardiac medications are updated in med rec and need ordered    Hospital Day #2 (5/5/2025)  - Nicardipine restarted   - Home BP meds ordered    Hospital day 2 (nights)  -no change to NIHSS  -MRI today  -one episode of emesis last pm (phlegm)  -right hand swollen IV removed placed in left wrist  -Cardene off this am

## 2025-05-06 NOTE — PLAN OF CARE
Problem: Chronic Conditions and Co-morbidities  Goal: Patient's chronic conditions and co-morbidity symptoms are monitored and maintained or improved  Outcome: Progressing  Flowsheets (Taken 5/5/2025 2000)  Care Plan - Patient's Chronic Conditions and Co-Morbidity Symptoms are Monitored and Maintained or Improved:   Monitor and assess patient's chronic conditions and comorbid symptoms for stability, deterioration, or improvement   Collaborate with multidisciplinary team to address chronic and comorbid conditions and prevent exacerbation or deterioration   Update acute care plan with appropriate goals if chronic or comorbid symptoms are exacerbated and prevent overall improvement and discharge     Problem: Discharge Planning  Goal: Discharge to home or other facility with appropriate resources  Outcome: Progressing  Flowsheets (Taken 5/5/2025 2000)  Discharge to home or other facility with appropriate resources:   Identify barriers to discharge with patient and caregiver   Arrange for needed discharge resources and transportation as appropriate   Identify discharge learning needs (meds, wound care, etc)   Refer to discharge planning if patient needs post-hospital services based on physician order or complex needs related to functional status, cognitive ability or social support system     Problem: Skin/Tissue Integrity  Goal: Skin integrity remains intact  Description: 1.  Monitor for areas of redness and/or skin breakdown2.  Assess vascular access sites hourly3.  Every 4-6 hours minimum:  Change oxygen saturation probe site4.  Every 4-6 hours:  If on nasal continuous positive airway pressure, respiratory therapy assess nares and determine need for appliance change or resting period  Outcome: Progressing  Flowsheets (Taken 5/5/2025 2000)  Skin Integrity Remains Intact:   Monitor for areas of redness and/or skin breakdown   Assess vascular access sites hourly     Problem: Safety - Adult  Goal: Free from fall

## 2025-05-06 NOTE — PROGRESS NOTES
Hospital Medicine Progress Note  V 5.1      Date of Admission: 5/4/2025    Hospital Day: 3      Chief Admission Complaint:  rt sided weakness    Subjective:  resting in bed, daughter at bedside, bp remains labile, currently off nicardipine ggt, still notes rt sided weakness, speech is improved, aware of needing MRI today    Presenting Admission History:         74 y.o. female who presented to [facility] with PMHx significant for COPD, hypertension and hyperlipidemia who presents with worsening aphasia as well as right-sided weakness.  Patient states that her right-sided weakness started approximately 1 week prior to presentation.  She did not tell anyone or seek medical care at that time.  Patient states that her speech is more difficult today.  She was brought in by her grandson after he went to visit and she was not able to answer his questions normally.  Patient also states that she has not taken her blood pressure medications for a number of days.  Patient's CT scan in the ER did show signs of CVA and her blood pressure was uncontrolled with a systolic of than 200..       Assessment/Plan:           CVA -present on CT scan.  Likely occurred approximately 1 week had a presentation. CTA ordered on 5/4/25(ntoed age indeterm infarcts to left parieto-occip lobe complex, left parietal lobe, medial rt occip lobe, <30% stenosis of prox bilat ICA, mild to mod stenosis of rt vert, prox left MCA M1 segment, severe of distal M1 segment)   MRI brain pending   Echo ordered on 5/4/25( ef 55%, mod lvh, nl wm, severe pulm htn,neg bubble study)   Neurology consulted and appreciated.   Rec DAPT for 3 weeks then mono therapy thereafter, LDL goal < 80  Continue ASA/Statin for 2nd prevention and risk reduction.  PT/OT/SLP ordered.  Family was interested in acute rehab   -A1c 5.6    Hypertensive urgency-secondary to noncompliance in the setting of acute CVA.  Blood pressure greater than 200 on presentation.

## 2025-05-06 NOTE — PROGRESS NOTES
Shift: [id number]    Reason for ICU Admission: right sided weakness and expressive aphasia     Most recent vitals: BP (!) 157/75   Pulse 60   Temp 98.6 °F (37 °C) (Oral)   Resp 17   Ht 1.676 m (5' 6\")   Wt 59.4 kg (131 lb)   SpO2 96%   BMI 21.14 kg/m²      Drip rates at handoff:    niCARdipine Stopped (05/06/25 0400)    sodium chloride         Current O2:   [] Room Air   [x] NC  L   [] BiPaP    [] Vented  % Fi02,  Peep    Hospital Course:  ICU Day # 1 (5/4/25)   -CT head confirmed recent infarct, mild symptoms   -MRI and echo ordered  -SBP was in the 200s in the ED, Cardene gtt was started and quickly weaned off after arriving to ICU  (goal -170)  -bed side swallow evaluation passed and diet ordered, cardiac medications are updated in med rec and need ordered    Hospital Day #2 (5/5/2025)  - Nicardipine restarted   - Home BP meds ordered    Hospital day 2 (nights)  -no change to NIHSS  -MRI today  -one episode of emesis last pm (phlegm)  -right hand swollen IV removed placed in left wrist  -Cardene off this am    ICU Day 3 (5/6/25)  -No change to NIHSS  -MRI obtained  -cardene stayed off

## 2025-05-06 NOTE — PROGRESS NOTES
Unable to see pt d/t being gone at MRI. Will follow up with patient tomorrow after MRI is completed.  Electronically signed by CRISTIAN Betancourt CNP on 5/6/25 at 3:31 PM EDT

## 2025-05-07 ENCOUNTER — ANCILLARY PROCEDURE (OUTPATIENT)
Dept: CARDIOLOGY CLINIC | Age: 75
End: 2025-05-07

## 2025-05-07 DIAGNOSIS — I63.9 CEREBROVASCULAR ACCIDENT (CVA), UNSPECIFIED MECHANISM (HCC): ICD-10-CM

## 2025-05-07 PROBLEM — Z86.73 HISTORY OF STROKE: Status: ACTIVE | Noted: 2025-05-07

## 2025-05-07 PROCEDURE — 6370000000 HC RX 637 (ALT 250 FOR IP): Performed by: STUDENT IN AN ORGANIZED HEALTH CARE EDUCATION/TRAINING PROGRAM

## 2025-05-07 PROCEDURE — 99232 SBSQ HOSP IP/OBS MODERATE 35: CPT | Performed by: STUDENT IN AN ORGANIZED HEALTH CARE EDUCATION/TRAINING PROGRAM

## 2025-05-07 PROCEDURE — 6370000000 HC RX 637 (ALT 250 FOR IP)

## 2025-05-07 PROCEDURE — 6360000002 HC RX W HCPCS: Performed by: STUDENT IN AN ORGANIZED HEALTH CARE EDUCATION/TRAINING PROGRAM

## 2025-05-07 PROCEDURE — APPSS30 APP SPLIT SHARED TIME 16-30 MINUTES

## 2025-05-07 PROCEDURE — 6370000000 HC RX 637 (ALT 250 FOR IP): Performed by: INTERNAL MEDICINE

## 2025-05-07 PROCEDURE — 94761 N-INVAS EAR/PLS OXIMETRY MLT: CPT

## 2025-05-07 PROCEDURE — 2700000000 HC OXYGEN THERAPY PER DAY

## 2025-05-07 PROCEDURE — 97162 PT EVAL MOD COMPLEX 30 MIN: CPT

## 2025-05-07 PROCEDURE — 97130 THER IVNTJ EA ADDL 15 MIN: CPT

## 2025-05-07 PROCEDURE — 97167 OT EVAL HIGH COMPLEX 60 MIN: CPT

## 2025-05-07 PROCEDURE — 6360000002 HC RX W HCPCS: Performed by: INTERNAL MEDICINE

## 2025-05-07 PROCEDURE — 2500000003 HC RX 250 WO HCPCS: Performed by: STUDENT IN AN ORGANIZED HEALTH CARE EDUCATION/TRAINING PROGRAM

## 2025-05-07 PROCEDURE — 97530 THERAPEUTIC ACTIVITIES: CPT

## 2025-05-07 PROCEDURE — 1200000000 HC SEMI PRIVATE

## 2025-05-07 PROCEDURE — 51798 US URINE CAPACITY MEASURE: CPT

## 2025-05-07 PROCEDURE — 97129 THER IVNTJ 1ST 15 MIN: CPT

## 2025-05-07 RX ORDER — SENNOSIDES 8.6 MG
325 CAPSULE ORAL DAILY
Status: DISCONTINUED | OUTPATIENT
Start: 2025-05-08 | End: 2025-05-09 | Stop reason: HOSPADM

## 2025-05-07 RX ORDER — ACETAMINOPHEN 325 MG/1
650 TABLET ORAL EVERY 6 HOURS PRN
Status: DISCONTINUED | OUTPATIENT
Start: 2025-05-07 | End: 2025-05-09 | Stop reason: HOSPADM

## 2025-05-07 RX ORDER — METOPROLOL SUCCINATE 25 MG/1
12.5 TABLET, EXTENDED RELEASE ORAL ONCE
Status: COMPLETED | OUTPATIENT
Start: 2025-05-07 | End: 2025-05-07

## 2025-05-07 RX ORDER — CLOPIDOGREL BISULFATE 75 MG/1
75 TABLET ORAL DAILY
Status: DISCONTINUED | OUTPATIENT
Start: 2025-05-08 | End: 2025-05-09 | Stop reason: HOSPADM

## 2025-05-07 RX ORDER — PROCHLORPERAZINE EDISYLATE 5 MG/ML
5 INJECTION INTRAMUSCULAR; INTRAVENOUS EVERY 6 HOURS PRN
Status: DISCONTINUED | OUTPATIENT
Start: 2025-05-07 | End: 2025-05-09 | Stop reason: HOSPADM

## 2025-05-07 RX ORDER — METOPROLOL SUCCINATE 25 MG/1
25 TABLET, EXTENDED RELEASE ORAL 2 TIMES DAILY
Status: DISCONTINUED | OUTPATIENT
Start: 2025-05-07 | End: 2025-05-09 | Stop reason: HOSPADM

## 2025-05-07 RX ORDER — HYDRALAZINE HYDROCHLORIDE 20 MG/ML
5 INJECTION INTRAMUSCULAR; INTRAVENOUS EVERY 6 HOURS PRN
Status: DISCONTINUED | OUTPATIENT
Start: 2025-05-07 | End: 2025-05-09 | Stop reason: HOSPADM

## 2025-05-07 RX ORDER — ATORVASTATIN CALCIUM 80 MG/1
80 TABLET, FILM COATED ORAL NIGHTLY
Status: DISCONTINUED | OUTPATIENT
Start: 2025-05-07 | End: 2025-05-09 | Stop reason: HOSPADM

## 2025-05-07 RX ADMIN — ENOXAPARIN SODIUM 40 MG: 100 INJECTION SUBCUTANEOUS at 08:57

## 2025-05-07 RX ADMIN — ONDANSETRON 4 MG: 4 TABLET, ORALLY DISINTEGRATING ORAL at 13:43

## 2025-05-07 RX ADMIN — METOPROLOL SUCCINATE 25 MG: 25 TABLET, EXTENDED RELEASE ORAL at 20:30

## 2025-05-07 RX ADMIN — METOPROLOL SUCCINATE 12.5 MG: 25 TABLET, EXTENDED RELEASE ORAL at 08:57

## 2025-05-07 RX ADMIN — ONDANSETRON 4 MG: 4 TABLET, ORALLY DISINTEGRATING ORAL at 00:03

## 2025-05-07 RX ADMIN — ASPIRIN 81 MG: 81 TABLET, CHEWABLE ORAL at 08:57

## 2025-05-07 RX ADMIN — MUPIROCIN: 20 OINTMENT TOPICAL at 09:01

## 2025-05-07 RX ADMIN — ONDANSETRON 4 MG: 2 INJECTION, SOLUTION INTRAMUSCULAR; INTRAVENOUS at 21:01

## 2025-05-07 RX ADMIN — HYDRALAZINE HYDROCHLORIDE 5 MG: 20 INJECTION INTRAMUSCULAR; INTRAVENOUS at 16:06

## 2025-05-07 RX ADMIN — SODIUM CHLORIDE, PRESERVATIVE FREE 10 ML: 5 INJECTION INTRAVENOUS at 09:01

## 2025-05-07 RX ADMIN — METOPROLOL SUCCINATE 12.5 MG: 25 TABLET, EXTENDED RELEASE ORAL at 13:42

## 2025-05-07 RX ADMIN — SODIUM CHLORIDE, PRESERVATIVE FREE 10 ML: 5 INJECTION INTRAVENOUS at 20:30

## 2025-05-07 RX ADMIN — LOSARTAN POTASSIUM 50 MG: 25 TABLET, FILM COATED ORAL at 08:57

## 2025-05-07 RX ADMIN — ATORVASTATIN CALCIUM 80 MG: 80 TABLET, FILM COATED ORAL at 20:30

## 2025-05-07 RX ADMIN — CLOPIDOGREL BISULFATE 75 MG: 75 TABLET, FILM COATED ORAL at 08:57

## 2025-05-07 NOTE — PROGRESS NOTES
Shift: [id number]    Reason for ICU Admission: right sided weakness and expressive aphasia     Most recent vitals: BP (!) 166/87   Pulse 66   Temp 97.9 °F (36.6 °C) (Oral)   Resp 23   Ht 1.676 m (5' 6\")   Wt 59 kg (130 lb 1.1 oz)   SpO2 94%   BMI 20.99 kg/m²      Drip rates at handoff:    niCARdipine Stopped (05/06/25 0400)    sodium chloride         Current O2:   [] Room Air   [x] NC  3L   [] BiPaP    [] Vented  % Fi02,  Peep    Hospital Course:  ICU Day # 1 (5/4/25)   -CT head confirmed recent infarct, mild symptoms   -MRI and echo ordered  -SBP was in the 200s in the ED, Cardene gtt was started and quickly weaned off after arriving to ICU  (goal -170)  -bed side swallow evaluation passed and diet ordered, cardiac medications are updated in med rec and need ordered    Hospital Day #2 (5/5/2025)  - Nicardipine restarted   - Home BP meds ordered    Hospital day 2 (nights)  -no change to NIHSS  -MRI today  -one episode of emesis last pm (phlegm)  -right hand swollen IV removed placed in left wrist  -Cardene off this am    ICU Day 3 (5/6/25)  -No change to NIHSS  -MRI obtained  -cardene stayed off    ICU Day #3 (5/6/2025)  -NIHSS 2  --160's  -episodes of bradycardia when asleep  HR=50's

## 2025-05-07 NOTE — CARE COORDINATION
Case Management Consulted: issues in home with fear of housing, food, medicla, transportation, and financial insecurity.  Many generations of family and extended family living in the home, possible issues with infestations r/t animals in the home (cats).  Pt was unkempt on arrival, states she has hard time bathing, etc.     PT/OT to work with pt today, writer will follow up with pt after their recommendations are in and will address above.  Writer asked Harriet/LISA about the above concerns, they are actually not active with pt.  EVE Hernandez-RN

## 2025-05-07 NOTE — PROGRESS NOTES
Patient transferred to room C540 from C2. Patient oriented to room, call light, bed rails, phone, lights and bathroom. Bed alarm in place, patient aware of placement and reason. Bed locked, in lowest position, side rails up 2/4, call light within reach.

## 2025-05-07 NOTE — PROGRESS NOTES
Occupational Therapy  Facility/Department: [facility] C2 CARD TELEMETRY  Occupational Therapy Initial Assessment    Name: Nany Almanza  : 1950  MRN: [id number]  Date of Service: 2025    Discharge Recommendations:  IP Rehab, Patient able to tolerate 3 hours of therapy per day      Patient Diagnosis(es): The encounter diagnosis was Cerebrovascular accident (CVA), unspecified mechanism (HCC).  Past Medical History:  has a past medical history of Allergic rhinitis and Malignant neoplasm of right kidney (HCC).  Past Surgical History:  has a past surgical history that includes Tonsillectomy; Colonoscopy; other surgical history (late 80s); and Kidney removal (Right, 2018).    Therapy discharge recommendations are subject to collaboration from the patient’s interdisciplinary healthcare team, including MD and case management recommendations.    Barriers to Home Discharge:   [x] Steps to access home entry or bed/bath:   [x] Unable to transfer, ambulate, or propel wheelchair household distances without assist   [] Limited available assist at home upon discharge    [] Patient or family requests d/c to post-acute facility    [x] Poor cognition/safety awareness for d/c to home alone    [] Unable to maintain ordered weight bearing status    [] Patient with salient signs of long-standing immobility   [x] Decreased independence with ADLs   [x] Increased risk for falls   [] Other:    If pt is unable to be seen after this session, please let this note serve as discharge summary.  Please see case management note for discharge disposition.  Thank you.    Assessment  Performance deficits / Impairments: Decreased functional mobility ;Decreased ADL status;Decreased high-level IADLs;Decreased balance;Decreased endurance  Assessment: Pt presents to [facility] with CVA. Pt PTA IND with ADLs. Pt this date completed bed mobility with SBA, and sit<>stand with CGA. Once standing required CGA for standing balance. Pt expressed increased

## 2025-05-07 NOTE — PROGRESS NOTES
Nany Almanza  Neurology Follow-up  [facility] Neurology    Date of Service: 5/7/2025    Subjective:   CC: Follow up today regarding: CVA     Events noted. Chart and lab reviewed. Speech somewhat improved today. MRI revealed patchy acute infarcts in the left posterior parietal-occipital region with smaller scattered infarcts in the left frontal and parietal lobes, as well as remote infarcts in the right medial occipital lobe, bilateral basal ganglia, and bilateral cerebellum.      ROS : A 10-12 system review obtained and updated today and is unremarkable except as mentioned  in my interval history.     Medication, past medical history, social history, and family history reviewed.    Objective:  Exam:   Constitutional:   Vitals:    05/07/25 0823 05/07/25 0845 05/07/25 1000 05/07/25 1330   BP: (!) 163/75 (!) 184/87 136/65 (!) 178/93   Pulse: 73 76 60 68   Resp:  26 16 20   Temp: 97.3 °F (36.3 °C)      TempSrc: Axillary      SpO2: 90% 92% 95% 96%   Weight:       Height:           General appearance:  Normal development and appear in no acute distress.   Mental Status:   Oriented to person, place, problem, and time.    Memory: Good immediate recall.  Intact remote memory  Normal attention span and concentration.  Language: intact naming, repeating and fluency. Daughter reports pt seems to have some word finding difficulty at times.   Good fund of Knowledge.   Cranial Nerves:   II:   Pupils: equal, round, reactive to light  III,IV,VI: Extra Ocular Movements are intact. No nystagmus  II: Visual fields: Pt reports she has Fuchs' dystrophy and has poor peripheral vision in her L eye at baseline. There does not appear to be any acute changes to her vision. Pupils: equal, round, reactive to light, bilaterally   V: Facial sensation is intact  VII: Facial strength and movements: intact and symmetric  XII: Tongue movements are normal  Musculoskeletal: 5/5 in all 4 extremities.   Reflexes: 2+ patellar, 2+

## 2025-05-07 NOTE — PROGRESS NOTES
Physical Therapy  Facility/Department: [facility] C2 CARD TELEMETRY  Physical Therapy Initial Assessment    Name: Nany Almanza  : 1950  MRN: [id number]  Date of Service: 2025    Discharge Recommendations:  IP Rehab, Patient able to tolerate 3 hours of therapy per day   PT Equipment Recommendations  Equipment Needed: No  Other: defer to next level of care      Therapy discharge recommendations are subject to collaboration from the patient’s interdisciplinary healthcare team, including MD and case management recommendations.    Barriers to Home Discharge:   [x] Steps to access home entry or bed/bath:   [x] Unable to transfer, ambulate, or propel wheelchair household distances without assist   [] Limited available assist at home upon discharge    [] Patient or family requests d/c to post-acute facility    [x] Poor cognition/safety awareness for d/c to home alone    [] Unable to maintain ordered weight bearing status    [] Patient with salient signs of long-standing immobility   [x] Decreased independence with ADLs   [x] Increased risk for falls   [] Other:    Patient Diagnosis(es): The encounter diagnosis was Cerebrovascular accident (CVA), unspecified mechanism (HCC).  Past Medical History:  has a past medical history of Allergic rhinitis and Malignant neoplasm of right kidney (HCC).  Past Surgical History:  has a past surgical history that includes Tonsillectomy; Colonoscopy; other surgical history (late 80s); and Kidney removal (Right, 2018).    Assessment  Body Structures, Functions, Activity Limitations Requiring Skilled Therapeutic Intervention: Decreased functional mobility ;Decreased endurance  Assessment: pt is 73 yo female admit to [facility] 2/2 CVA with R side weakness and expressive aphasia; PMH: COPD; pt lives with family and is ind at baseline and reports not using a device for ambulation, pt presents below baseline function requiring Max A for scooting to the eob, CGA to stand, and inability to

## 2025-05-07 NOTE — PROGRESS NOTES
Hospital Medicine Progress Note  V 5.1      Date of Admission: 5/4/2025    Hospital Day: 4      Chief Admission Complaint:  rt sided weakness    Subjective:  resting in bed, daughter at bedside, bp stable, remains off nicardipine ggt, still notes rt sided weakness, speech is improved, made aware of  MRI results    Presenting Admission History:         74 y.o. female who presented to [facility] with PMHx significant for COPD, hypertension and hyperlipidemia who presents with worsening aphasia as well as right-sided weakness.  Patient states that her right-sided weakness started approximately 1 week prior to presentation.  She did not tell anyone or seek medical care at that time.  Patient states that her speech is more difficult today.  She was brought in by her grandson after he went to visit and she was not able to answer his questions normally.  Patient also states that she has not taken her blood pressure medications for a number of days.  Patient's CT scan in the ER did show signs of CVA and her blood pressure was uncontrolled with a systolic of than 200..       Assessment/Plan:           CVA -present on CT scan.  Likely occurred approximately 1 week had a presentation. CTA ordered on 5/4/25(ntoed age indeterm infarcts to left parieto-occip lobe complex, left parietal lobe, medial rt occip lobe, <30% stenosis of prox bilat ICA, mild to mod stenosis of rt vert, prox left MCA M1 segment, severe of distal M1 segment)   MRI brain done, noted multiple cva areas on left side   Echo ordered on 5/4/25( ef 55%, mod lvh, nl wm,mod TR,  severe pulm htn,neg bubble study)   Neurology consulted and appreciated.   Rec DAPT for 3 weeks then mono therapy thereafter, LDL goal < 80  Continue ASA/Statin for 2nd prevention and risk reduction.  PT/OT/SLP ordered.  Family was interested in acute rehab   -A1c 5.6  -may need monitor on dc    Hypertensive urgency-secondary to noncompliance in the setting of acute CVA.

## 2025-05-07 NOTE — PROGRESS NOTES
4 Eyes Skin Assessment     NAME:  Nany Almanza  YOB: 1950  MEDICAL RECORD NUMBER:  [id number]    The patient is being assessed for  Transfer to New Unit    I agree that at least one RN has performed a thorough Head to Toe Skin Assessment on the patient. ALL assessment sites listed below have been assessed.      Areas assessed by both nurses:    Head, Face, Ears, Shoulders, Back, Chest, Arms, Elbows, Hands, Sacrum. Buttock, Coccyx, Ischium, Legs. Feet and Heels, and Under Medical Devices         Does the Patient have a Wound? No noted wound(s)       Lorenzo Prevention initiated by RN: No  Wound Care Orders initiated by RN: No    Pressure Injury (Stage 3,4, Unstageable, DTI, NWPT, and Complex wounds) if present, place Wound referral order by RN under : No    New Ostomies, if present place, Ostomy referral order under : No     Nurse 1 eSignature: Electronically signed by Blayne Aguilera RN on 5/7/25 at 2:16 PM EDT    **SHARE this note so that the co-signing nurse can place an eSignature**    Nurse 2 eSignature: Electronically signed by Agata Schroeder RN on 5/7/25 at 3:13 PM EDT

## 2025-05-07 NOTE — PROGRESS NOTES
Speech Language Pathology  Dysphagia Treatment Follow-Up Note  Speech / Language / Cognitive Treatment Follow-Up Note  Facility/Department: [facility] C2 CARD TELEMETRY    Recommendations:  Solid Consistency: IDDSI 7 Regular Solids  Liquid Consistency: IDDSI 0 Thin Liquids  Medication: Meds PO as tolerated    Risk Management: upright for all intake, stay upright for at least 30 mins after intake, small bites/sips, general GERD precautions, general aspiration precautions, and hold PO and contact SLP if s/s of aspiration or worsening respiratory status develop..     NAME:Nany Almanza  : 1950 (74 y.o.)   MRN: [id number]  ROOM: [address] [place] [address]  ADMISSION DATE: 2025  PATIENT DIAGNOSIS(ES): Acute CVA (cerebrovascular accident) (HCC) [I63.9]  Cerebrovascular accident (CVA), unspecified mechanism (HCC) [I63.9]  No Known Allergies    DATE ONSET: 2025    Pain: The patient c/o R arm pain- RN alerted       Current Diet: ADULT DIET; Regular      Diet Tolerance:  Patient tolerating current diet level without signs/symptoms of aspiration per report    Subjective:   Pt seen in room at bedside with RN permission   Behavior / Cognition: Lethargic, but pleasant and cooperative  RN Report/Chart Review: Pt s/p MRI 25: \"Patchy acute infarcts as detailed, largest in the left posterior parietal-occipital region with smaller scattered infarcts in the left frontal and parietal lobes. No intracranial hemorrhage. Mild atrophy and mild/moderate chronic small vessel ischemic change. Scattered small remote infarcts as detailed.\"  Patient tolerance: Tolerating per report, declined intake with SLP    Dysphagia Treatment and Impressions:  Baseline Respiratory Status Measures: Pt with SPO2% of  on 2.5 LPM NC with RR of 18    Liquid PO Trials:    IDDSI 0 Thin:  Pt declined    Solid PO Trials  IDDSI 7 Regular:   Pt declined    Repeat Respiratory Status Measures: Not taken as pt declined PO intake    Impressions:    Pt declined PO

## 2025-05-07 NOTE — PLAN OF CARE
Problem: Chronic Conditions and Co-morbidities  Goal: Patient's chronic conditions and co-morbidity symptoms are monitored and maintained or improved  5/6/2025 2232 by Frances Santos RN  Outcome: Progressing  Flowsheets (Taken 5/6/2025 2000)  Care Plan - Patient's Chronic Conditions and Co-Morbidity Symptoms are Monitored and Maintained or Improved:   Monitor and assess patient's chronic conditions and comorbid symptoms for stability, deterioration, or improvement   Collaborate with multidisciplinary team to address chronic and comorbid conditions and prevent exacerbation or deterioration   Update acute care plan with appropriate goals if chronic or comorbid symptoms are exacerbated and prevent overall improvement and discharge     Problem: Discharge Planning  Goal: Discharge to home or other facility with appropriate resources  5/6/2025 2232 by Frances Santos RN  Outcome: Progressing  Flowsheets (Taken 5/6/2025 2000)  Discharge to home or other facility with appropriate resources:   Identify barriers to discharge with patient and caregiver   Refer to discharge planning if patient needs post-hospital services based on physician order or complex needs related to functional status, cognitive ability or social support system     Problem: Skin/Tissue Integrity  Goal: Skin integrity remains intact  Description: 1.  Monitor for areas of redness and/or skin breakdown2.  Assess vascular access sites hourly3.  Every 4-6 hours minimum:  Change oxygen saturation probe site4.  Every 4-6 hours:  If on nasal continuous positive airway pressure, respiratory therapy assess nares and determine need for appliance change or resting period  5/6/2025 2232 by Frances Santos, RN  Outcome: Progressing  Flowsheets (Taken 5/6/2025 2000)  Skin Integrity Remains Intact:   Monitor for areas of redness and/or skin breakdown   Assess vascular access sites hourly   Every 4-6 hours minimum:  Change oxygen saturation probe site   Check  visual cues for pain     Problem: Safety - Adult  Goal: Free from fall injury  5/6/2025 2232 by Frances Santos, RN  Outcome: Progressing  Flowsheets (Taken 5/6/2025 2000)  Free From Fall Injury:   Instruct family/caregiver on patient safety   Based on caregiver fall risk screen, instruct family/caregiver to ask for assistance with transferring infant if caregiver noted to have fall risk factors     Problem: Neurosensory - Adult  Goal: Achieves stable or improved neurological status  Outcome: Progressing  Flowsheets (Taken 5/6/2025 2000)  Achieves stable or improved neurological status: Assess for and report changes in neurological status

## 2025-05-07 NOTE — PLAN OF CARE
TODAY'S DATE:  5/7/2025      Current NIHSS 0      Discussed personal risk factors for Stroke/TIA with patient/family, and ways to reduce the risk for a recurrent stroke.     Patient's personal risk factors which were identified are:   []   Alcohol Abuse: check with your physician before any alcohol consumption.   []   Atrial fibrillation: may cause blood clots  []   Drug Abuse: Seek help, talk with your doctor  []   Clotting Disorder  []   Diabetes  []   Family history of stroke or heart disease  [x]   High Blood Pressure/Hypertension: work with your physician  [x]   High cholesterol: monitor cholesterol levels with your physician  []   Overweight/Obesity: work with your physician for your ideal body weight  [x]   Physical Inactivity: get regular exercise as directed by your physician  []   Personal history of previous TIA or stroke  []   Poor Diet: decrease salt (sodium) in your diet, follow diet directed by physician  [x]   Smoking: stop smoking      Reviewed the Following Education with Patient and/or Family:   - Signs and Symptoms of Stroke  - Personal risk factors   - How to activate EMS (911)   - Importance of Follow Up Appointments at Discharge   - Importance of Compliance with Medications Prescribed at Discharge  - Available community resources and stroke advocacy groups if needed    Patient and/or family member: verbalized understanding.     Stroke Education booklet given to patient/family (or verified, if given already), which reviews above information. yes         Electronically signed by Blayne Aguilera RN on 5/7/2025 at 3:38 PM       Problem: Chronic Conditions and Co-morbidities  Goal: Patient's chronic conditions and co-morbidity symptoms are monitored and maintained or improved  Outcome: Progressing  Flowsheets (Taken 5/7/2025 0800 by Jennie Tamayo RN)  Care Plan - Patient's Chronic Conditions and Co-Morbidity Symptoms are Monitored and Maintained or Improved:   Monitor and assess patient's chronic  conditions and comorbid symptoms for stability, deterioration, or improvement   Collaborate with multidisciplinary team to address chronic and comorbid conditions and prevent exacerbation or deterioration   Update acute care plan with appropriate goals if chronic or comorbid symptoms are exacerbated and prevent overall improvement and discharge     Problem: Discharge Planning  Goal: Discharge to home or other facility with appropriate resources  Outcome: Progressing  Flowsheets (Taken 5/7/2025 0800 by Jennie Tamayo, RN)  Discharge to home or other facility with appropriate resources:   Identify barriers to discharge with patient and caregiver   Arrange for needed discharge resources and transportation as appropriate   Identify discharge learning needs (meds, wound care, etc)   Refer to discharge planning if patient needs post-hospital services based on physician order or complex needs related to functional status, cognitive ability or social support system     Problem: Skin/Tissue Integrity  Goal: Skin integrity remains intact  Description: 1.  Monitor for areas of redness and/or skin breakdown2.  Assess vascular access sites hourly3.  Every 4-6 hours minimum:  Change oxygen saturation probe site4.  Every 4-6 hours:  If on nasal continuous positive airway pressure, respiratory therapy assess nares and determine need for appliance change or resting period  Outcome: Progressing  Flowsheets (Taken 5/7/2025 0800 by Jennie Tamayo, RN)  Skin Integrity Remains Intact:   Monitor for areas of redness and/or skin breakdown   Assess vascular access sites hourly     Problem: Safety - Adult  Goal: Free from fall injury  Outcome: Progressing     Problem: Neurosensory - Adult  Goal: Achieves stable or improved neurological status  Outcome: Progressing

## 2025-05-08 ENCOUNTER — TELEPHONE (OUTPATIENT)
Dept: CARDIAC CATH/INVASIVE PROCEDURES | Age: 75
End: 2025-05-08

## 2025-05-08 LAB — ECHO BSA: 1.66 M2

## 2025-05-08 PROCEDURE — 6360000002 HC RX W HCPCS: Performed by: STUDENT IN AN ORGANIZED HEALTH CARE EDUCATION/TRAINING PROGRAM

## 2025-05-08 PROCEDURE — 6360000002 HC RX W HCPCS: Performed by: INTERNAL MEDICINE

## 2025-05-08 PROCEDURE — 2500000003 HC RX 250 WO HCPCS: Performed by: STUDENT IN AN ORGANIZED HEALTH CARE EDUCATION/TRAINING PROGRAM

## 2025-05-08 PROCEDURE — 6370000000 HC RX 637 (ALT 250 FOR IP)

## 2025-05-08 PROCEDURE — 6370000000 HC RX 637 (ALT 250 FOR IP): Performed by: INTERNAL MEDICINE

## 2025-05-08 PROCEDURE — 6370000000 HC RX 637 (ALT 250 FOR IP): Performed by: STUDENT IN AN ORGANIZED HEALTH CARE EDUCATION/TRAINING PROGRAM

## 2025-05-08 PROCEDURE — 1200000000 HC SEMI PRIVATE

## 2025-05-08 PROCEDURE — 94761 N-INVAS EAR/PLS OXIMETRY MLT: CPT

## 2025-05-08 PROCEDURE — 2700000000 HC OXYGEN THERAPY PER DAY

## 2025-05-08 RX ORDER — AMLODIPINE BESYLATE 5 MG/1
5 TABLET ORAL DAILY
Status: DISCONTINUED | OUTPATIENT
Start: 2025-05-08 | End: 2025-05-09

## 2025-05-08 RX ADMIN — HYDRALAZINE HYDROCHLORIDE 5 MG: 20 INJECTION INTRAMUSCULAR; INTRAVENOUS at 15:23

## 2025-05-08 RX ADMIN — SODIUM CHLORIDE, PRESERVATIVE FREE 10 ML: 5 INJECTION INTRAVENOUS at 09:12

## 2025-05-08 RX ADMIN — METOPROLOL SUCCINATE 25 MG: 25 TABLET, EXTENDED RELEASE ORAL at 20:28

## 2025-05-08 RX ADMIN — SODIUM CHLORIDE, PRESERVATIVE FREE 10 ML: 5 INJECTION INTRAVENOUS at 20:29

## 2025-05-08 RX ADMIN — METOPROLOL SUCCINATE 25 MG: 25 TABLET, EXTENDED RELEASE ORAL at 09:12

## 2025-05-08 RX ADMIN — ASPIRIN 325 MG: 325 TABLET, COATED ORAL at 09:12

## 2025-05-08 RX ADMIN — LOSARTAN POTASSIUM 50 MG: 25 TABLET, FILM COATED ORAL at 09:11

## 2025-05-08 RX ADMIN — ATORVASTATIN CALCIUM 80 MG: 80 TABLET, FILM COATED ORAL at 20:28

## 2025-05-08 RX ADMIN — CLOPIDOGREL BISULFATE 75 MG: 75 TABLET, FILM COATED ORAL at 09:12

## 2025-05-08 RX ADMIN — AMLODIPINE BESYLATE 5 MG: 5 TABLET ORAL at 14:34

## 2025-05-08 NOTE — CONSULTS
Nany Almanza  5/8/2025  [id number]    Chief Complaint: Cerebrovascular accident (CVA) (HCC)    Subjective:   This is a 74-year-old female with past medical history including:  Past Medical History:   Diagnosis Date    Allergic rhinitis     Malignant neoplasm of right kidney (HCC)     kidney     You came to the hospital with aphasia and right-sided weakness.  Patient CTA showed left parieto-occip lobe complex, left parietal lobe, medial rt occip lobe, <30% stenosis of prox bilat ICA, mild to mod stenosis of rt vert, prox left MCA M1 segment, severe of distal M1 segment), MRI brain done, noted multiple cva areas on left side.  Patient currently is limited in functional mobility but the patient is refusing ARU at this time.  She would like to go home with help from her daughter.  Case management has been notified.    ROS: No CP, SOB, dyspnea    Objective:  Patient Vitals for the past 24 hrs:   BP Temp Temp src Pulse Resp SpO2   05/08/25 1523 (!) 196/88 -- -- -- -- --   05/08/25 1430 (!) 191/93 -- -- 71 -- 94 %   05/08/25 1133 (!) 184/97 97.7 °F (36.5 °C) Axillary 62 18 93 %   05/08/25 0900 (!) 179/65 98.1 °F (36.7 °C) Oral 79 16 92 %   05/08/25 0340 (!) 159/76 98.1 °F (36.7 °C) Oral 59 16 96 %   05/07/25 2242 (!) 168/71 98.1 °F (36.7 °C) Oral 63 16 93 %   05/07/25 2122 (!) 175/82 -- -- 74 -- --   05/07/25 2025 (!) 190/93 97.5 °F (36.4 °C) Oral 70 16 93 %     Gen: No distress, pleasant.   HEENT: Normocephalic, atraumatic.   CV: No audible murmurs, well perfused extremities  Resp: No respiratory distress. No increased WOB  Abd: Soft, nontender nondistended  Ext: No edema.  Right-sided weakness  Neuro: Alert, oriented, appropriately interactive.     Laboratory data: Available via EMR.     Therapy progress:    PT    Rolling: Level of difficulty - A Little   Sit to Stand from a Chair: Level of difficulty - A Little  Supine to Sit: Level of difficulty - A Little     Bed to Chair: Physical Assistance Required - A

## 2025-05-08 NOTE — PLAN OF CARE
Problem: Chronic Conditions and Co-morbidities  Goal: Patient's chronic conditions and co-morbidity symptoms are monitored and maintained or improved  Outcome: Progressing     Problem: Skin/Tissue Integrity  Goal: Skin integrity remains intact  Description: 1.  Monitor for areas of redness and/or skin breakdown2.  Assess vascular access sites hourly3.  Every 4-6 hours minimum:  Change oxygen saturation probe site4.  Every 4-6 hours:  If on nasal continuous positive airway pressure, respiratory therapy assess nares and determine need for appliance change or resting period  Outcome: Progressing     Problem: Neurosensory - Adult  Goal: Achieves stable or improved neurological status  Recent Flowsheet Documentation  Taken 5/8/2025 0900 by Nichelle Hollins, SHAW[facility]  Achieves stable or improved neurological status: Assess for and report changes in neurological status

## 2025-05-08 NOTE — CARE COORDINATION
[facility] - Acute Rehab Unit   After review, this patient is felt to be:       []  Appropriate for Acute Inpatient Rehab    []  Appropriate for Acute Inpatient Rehab Pending Insurance Authorization    []  Not appropriate for Acute Inpatient Rehab    [x]  Referral received and ARU reviewing patient; Evaluation ongoing.      Dr. Quinones covering for Dr. Granados today.  Will round later this afternoon.  During my discussion with patient and family member she did desire to return home at [place] from acute care.  Family confirmed she could assist. Will follow up during rounds with Dr. Quinones.    Will notify DCP with further updates. Thank you for the referral.    Braulio Hdez MPH, RRT  ARU Admissions Supervisor-[facility]  (P)[phone]  (F)[phone]   Electronically signed by Braulio Hdez on 5/8/2025 at 9:05 AM

## 2025-05-08 NOTE — PROGRESS NOTES
TODAY'S DATE:  5/7/2025      Current NIHSS 0      Discussed personal risk factors for Stroke/TIA with patient/family, and ways to reduce the risk for a recurrent stroke.     Patient's personal risk factors which were identified are:   []   Alcohol Abuse: check with your physician before any alcohol consumption.   []   Atrial fibrillation: may cause blood clots  []   Drug Abuse: Seek help, talk with your doctor  []   Clotting Disorder  []   Diabetes  []   Family history of stroke or heart disease  [x]   High Blood Pressure/Hypertension: work with your physician  [x]   High cholesterol: monitor cholesterol levels with your physician  []   Overweight/Obesity: work with your physician for your ideal body weight  []   Physical Inactivity: get regular exercise as directed by your physician  []   Personal history of previous TIA or stroke  []   Poor Diet: decrease salt (sodium) in your diet, follow diet directed by physician  []   Smoking: stop smoking      Reviewed the Following Education with Patient and/or Family:   - Signs and Symptoms of Stroke  - Personal risk factors   - How to activate EMS (911)   - Importance of Follow Up Appointments at Discharge   - Importance of Compliance with Medications Prescribed at Discharge  - Available community resources and stroke advocacy groups if needed    Patient and/or family member: verbalized understanding.     Stroke Education booklet given to patient/family (or verified, if given already), which reviews above information. yes         Electronically signed by Selene Rivera RN on 5/7/2025 at 9:56 PM

## 2025-05-08 NOTE — TELEPHONE ENCOUNTER
Monitor company [employer]  Length of monitor 30 days  Monitor ordered by SAVAGE Carmona   Patch ID [id number]  Device number [id number]  Monitor given to Disha Pruitt, [facility]   Nurse to apply at the time of discharge.

## 2025-05-08 NOTE — PLAN OF CARE
Problem: Neurosensory - Adult  Goal: Achieves stable or improved neurological status  5/7/2025 2157 by Selene Rivera, RN  Outcome: Progressing  5/7/2025 2140 by Selene Rivera RN  Outcome: Progressing  5/7/2025 1538 by Blayne Aguilera RN  Outcome: Progressing

## 2025-05-08 NOTE — PROGRESS NOTES
Hospital Medicine Progress Note  V 5.1      Date of Admission: 5/4/2025    Hospital Day: 5      Chief Admission Complaint:  rt sided weakness    Subjective:  resting in bed, daughter at bedside, bp trending up again and sbp 190, still notes rt sided weakness, speech is improved, made aware of IPR recs, pt wants to go home    Presenting Admission History:         74 y.o. female who presented to [facility] with PMHx significant for COPD, hypertension and hyperlipidemia who presents with worsening aphasia as well as right-sided weakness.  Patient states that her right-sided weakness started approximately 1 week prior to presentation.  She did not tell anyone or seek medical care at that time.  Patient states that her speech is more difficult today.  She was brought in by her grandson after he went to visit and she was not able to answer his questions normally.  Patient also states that she has not taken her blood pressure medications for a number of days.  Patient's CT scan in the ER did show signs of CVA and her blood pressure was uncontrolled with a systolic of than 200..       Assessment/Plan:           CVA -present on CT scan.  Likely occurred approximately 1 week had a presentation. CTA ordered on 5/4/25(ntoed age indeterm infarcts to left parieto-occip lobe complex, left parietal lobe, medial rt occip lobe, <30% stenosis of prox bilat ICA, mild to mod stenosis of rt vert, prox left MCA M1 segment, severe of distal M1 segment)   MRI brain done, noted multiple cva areas on left side   Echo ordered on 5/4/25( ef 55%, mod lvh, nl wm,mod TR,  severe pulm htn,neg bubble study)   Neurology consulted and appreciated.   Rec DAPT for 90  days then mono therapy thereafter, LDL goal < 80  Continue ASA/Statin for 2nd prevention and risk reduction.  PT/OT/SLP ordered.  Family was interested in acute rehab   -A1c 5.6  -arranged 30 day monitor on dc    Hypertensive urgency-secondary to noncompliance in the

## 2025-05-08 NOTE — PLAN OF CARE
Problem: Chronic Conditions and Co-morbidities  Goal: Patient's chronic conditions and co-morbidity symptoms are monitored and maintained or improved  5/7/2025 2140 by Selene Rivera RN  Outcome: Progressing  5/7/2025 1538 by Blayne Aguilera RN  Outcome: Progressing  Flowsheets (Taken 5/7/2025 0800 by Jennie Tamayo RN)  Care Plan - Patient's Chronic Conditions and Co-Morbidity Symptoms are Monitored and Maintained or Improved:   Monitor and assess patient's chronic conditions and comorbid symptoms for stability, deterioration, or improvement   Collaborate with multidisciplinary team to address chronic and comorbid conditions and prevent exacerbation or deterioration   Update acute care plan with appropriate goals if chronic or comorbid symptoms are exacerbated and prevent overall improvement and discharge     Problem: Discharge Planning  Goal: Discharge to home or other facility with appropriate resources  5/7/2025 2140 by Selene Rivera RN  Outcome: Progressing  5/7/2025 1538 by Blayne Aguilera RN  Outcome: Progressing  Flowsheets (Taken 5/7/2025 0800 by Jennie Tamayo RN)  Discharge to home or other facility with appropriate resources:   Identify barriers to discharge with patient and caregiver   Arrange for needed discharge resources and transportation as appropriate   Identify discharge learning needs (meds, wound care, etc)   Refer to discharge planning if patient needs post-hospital services based on physician order or complex needs related to functional status, cognitive ability or social support system     Problem: Skin/Tissue Integrity  Goal: Skin integrity remains intact  Description: 1.  Monitor for areas of redness and/or skin breakdown2.  Assess vascular access sites hourly3.  Every 4-6 hours minimum:  Change oxygen saturation probe site4.  Every 4-6 hours:  If on nasal continuous positive airway pressure, respiratory therapy assess nares and determine need for appliance change or resting  period  5/7/2025 2140 by Selene Rivera, RN  Outcome: Progressing  5/7/2025 1538 by Blayne Aguilera RN  Outcome: Progressing  Flowsheets (Taken 5/7/2025 0800 by Jennie Tamayo, RN)  Skin Integrity Remains Intact:   Monitor for areas of redness and/or skin breakdown   Assess vascular access sites hourly     Problem: Safety - Adult  Goal: Free from fall injury  5/7/2025 2140 by Selene Rivera RN  Outcome: Progressing  5/7/2025 1538 by Blayne Aguilera, RN  Outcome: Progressing     Problem: Neurosensory - Adult  Goal: Achieves stable or improved neurological status  5/7/2025 2140 by Selene Rivera RN  Outcome: Progressing  5/7/2025 1538 by Blayne Aguilera RN  Outcome: Progressing  Goal: Achieves maximal functionality and self care  Outcome: Progressing

## 2025-05-08 NOTE — PROGRESS NOTES
Occupational/Physical Therapy    Attempted to see pt for follow up OT/PT session, RN approving therapy. Pt hypertensive upon arrival (191/93). Spoke w/ RN who came to room to evaluate. Follow up /94. RN administering BP medication and decided to hold therapy at this time. Will follow up as pt condition and therapy schedule permit.       Tia Calles, OTR/L    Davey Chilel, PTA

## 2025-05-08 NOTE — CARE COORDINATION
Writer spoke with Braulio in ARU, patient refused ARU 2x. Family agreed to take home and assist. Will find [facility] for patient.     Martina Ivan RN

## 2025-05-09 VITALS
BODY MASS INDEX: 20.9 KG/M2 | HEIGHT: 66 IN | RESPIRATION RATE: 16 BRPM | DIASTOLIC BLOOD PRESSURE: 81 MMHG | SYSTOLIC BLOOD PRESSURE: 189 MMHG | WEIGHT: 130.07 LBS | HEART RATE: 71 BPM | OXYGEN SATURATION: 96 % | TEMPERATURE: 97.3 F

## 2025-05-09 PROCEDURE — 97530 THERAPEUTIC ACTIVITIES: CPT

## 2025-05-09 PROCEDURE — 6370000000 HC RX 637 (ALT 250 FOR IP): Performed by: INTERNAL MEDICINE

## 2025-05-09 PROCEDURE — 97116 GAIT TRAINING THERAPY: CPT

## 2025-05-09 PROCEDURE — 2700000000 HC OXYGEN THERAPY PER DAY

## 2025-05-09 PROCEDURE — 97110 THERAPEUTIC EXERCISES: CPT

## 2025-05-09 PROCEDURE — 2500000003 HC RX 250 WO HCPCS: Performed by: STUDENT IN AN ORGANIZED HEALTH CARE EDUCATION/TRAINING PROGRAM

## 2025-05-09 PROCEDURE — 6360000002 HC RX W HCPCS: Performed by: INTERNAL MEDICINE

## 2025-05-09 PROCEDURE — 6370000000 HC RX 637 (ALT 250 FOR IP): Performed by: STUDENT IN AN ORGANIZED HEALTH CARE EDUCATION/TRAINING PROGRAM

## 2025-05-09 PROCEDURE — 94761 N-INVAS EAR/PLS OXIMETRY MLT: CPT

## 2025-05-09 PROCEDURE — 6370000000 HC RX 637 (ALT 250 FOR IP)

## 2025-05-09 RX ORDER — AMLODIPINE BESYLATE 2.5 MG/1
2.5 TABLET ORAL ONCE
Status: COMPLETED | OUTPATIENT
Start: 2025-05-09 | End: 2025-05-09

## 2025-05-09 RX ORDER — CLOPIDOGREL BISULFATE 75 MG/1
75 TABLET ORAL DAILY
Qty: 88 TABLET | Refills: 0 | Status: SHIPPED | OUTPATIENT
Start: 2025-05-10 | End: 2025-08-06

## 2025-05-09 RX ORDER — METOPROLOL SUCCINATE 25 MG/1
25 TABLET, EXTENDED RELEASE ORAL 2 TIMES DAILY
Qty: 60 TABLET | Refills: 1 | Status: SHIPPED | OUTPATIENT
Start: 2025-05-09

## 2025-05-09 RX ORDER — SENNOSIDES 8.6 MG
325 CAPSULE ORAL DAILY
Qty: 30 TABLET | Refills: 1 | Status: SHIPPED | OUTPATIENT
Start: 2025-05-10

## 2025-05-09 RX ORDER — NICOTINE 21 MG/24HR
1 PATCH, TRANSDERMAL 24 HOURS TRANSDERMAL DAILY
Qty: 30 PATCH | Refills: 0 | Status: SHIPPED | OUTPATIENT
Start: 2025-05-10

## 2025-05-09 RX ORDER — CLONIDINE HYDROCHLORIDE 0.1 MG/1
0.1 TABLET ORAL EVERY 4 HOURS PRN
Status: DISCONTINUED | OUTPATIENT
Start: 2025-05-09 | End: 2025-05-09 | Stop reason: HOSPADM

## 2025-05-09 RX ORDER — NICOTINE 21 MG/24HR
1 PATCH, TRANSDERMAL 24 HOURS TRANSDERMAL EVERY 24 HOURS
Qty: 14 PATCH | Refills: 0 | Status: SHIPPED | OUTPATIENT
Start: 2025-05-09 | End: 2025-05-23

## 2025-05-09 RX ADMIN — METOPROLOL SUCCINATE 25 MG: 25 TABLET, EXTENDED RELEASE ORAL at 08:49

## 2025-05-09 RX ADMIN — HYDRALAZINE HYDROCHLORIDE 5 MG: 20 INJECTION INTRAMUSCULAR; INTRAVENOUS at 00:24

## 2025-05-09 RX ADMIN — HYDRALAZINE HYDROCHLORIDE 5 MG: 20 INJECTION INTRAMUSCULAR; INTRAVENOUS at 08:54

## 2025-05-09 RX ADMIN — LOSARTAN POTASSIUM 50 MG: 25 TABLET, FILM COATED ORAL at 08:49

## 2025-05-09 RX ADMIN — SODIUM CHLORIDE, PRESERVATIVE FREE 10 ML: 5 INJECTION INTRAVENOUS at 08:53

## 2025-05-09 RX ADMIN — CLOPIDOGREL BISULFATE 75 MG: 75 TABLET, FILM COATED ORAL at 08:49

## 2025-05-09 RX ADMIN — AMLODIPINE BESYLATE 5 MG: 5 TABLET ORAL at 08:48

## 2025-05-09 RX ADMIN — AMLODIPINE BESYLATE 2.5 MG: 2.5 TABLET ORAL at 10:34

## 2025-05-09 RX ADMIN — ASPIRIN 325 MG: 325 TABLET, COATED ORAL at 08:49

## 2025-05-09 RX ADMIN — CLONIDINE HYDROCHLORIDE 0.1 MG: 0.1 TABLET ORAL at 12:57

## 2025-05-09 NOTE — PROGRESS NOTES
Physical Therapy  Facility/Department: [facility] C5 - MED SURG/ORTHO  Daily Treatment Note  NAME: Nnay Almanza  : 1950  MRN: [id number]    Date of Service: 2025    Discharge Recommendations:  24 hour supervision or assist, Home with Home health PT   PT Equipment Recommendations  Equipment Needed: No (pt states owns a rw)    Patient Diagnosis(es): The encounter diagnosis was Cerebrovascular accident (CVA), unspecified mechanism (HCC).    Assessment  Assessment: pt demos sba for bed mobility, CGA to stand, and amb ' rw cga. pt will benefit from continued PT; recommend d/c to home  assist and HHPT.  Activity Tolerance: Patient tolerated treatment well;Patient limited by endurance;Treatment limited secondary to medical complications  Equipment Needed: No (pt states owns a rw)    Plan  Physical Therapy Plan  General Plan: 5-7 times per week    Restrictions  Restrictions/Precautions  Restrictions/Precautions: General Precautions  Activity Level: Up as Tolerated  Position Activity Restriction  Other Position/Activity Restrictions: 2L O2, ICU monitoring     Subjective   Subjective  Subjective: Pt agrees to therapy session; dtr present  Pain: pt denied pain at rest    Objective  Vitals  Vitals:    25 1123   BP: (!) 189/81   Pulse: 71   Resp: 16   Temp: 97.3 °F (36.3 °C)   SpO2: 96%   After 1st amb /82 in chair, then after 2nd amb /88 in chair.  Pt's RN aware.  Pt not symptomatic  Bed Mobility Training  Bed Mobility Training: Yes  Supine to Sit: Stand by assistance  Scooting: Stand by assistance (EOB)  Balance  Sitting: Intact  Standing: Impaired (rw; rpts chronic vision defecit)  Standing - Static: Good;Constant support  Standing - Dynamic: Fair;Constant support  Transfer Training  Transfer Training:  (rw)  Bed to Chair: Contact guard assistance  Gait  Gait Training: Yes  Overall Level of Assistance: Contact guard assistance  Distance (ft): 22 Feet (x2)  Assistive Device: Walker,

## 2025-05-09 NOTE — PROGRESS NOTES
Occupational Therapy  Facility/Department: [facility] C5 - MED SURG/ORTHO  Daily Treatment Note  NAME: Nany Almanza  : 1950  MRN: [id number]    Date of Service: 2025    Discharge Recommendations:  IP Rehab, Patient able to tolerate 3 hours of therapy per day  OT Equipment Recommendations  Equipment Needed: Yes (if pt declines IPR and returns home)  Mobility Devices: ADL Assistive Devices  ADL Assistive Devices: Transfer Tub Bench  Other: If pt declines IPR/SNF and returns home, she will require 24hr assist/SPV, HHOT, and TTB    Therapy discharge recommendations are subject to collaboration from the patient’s interdisciplinary healthcare team, including MD and case management recommendations.    Barriers to Home Discharge:   [x] Steps to access home entry or bed/bath:   [x] Unable to transfer, ambulate, or propel wheelchair household distances without assist   [x] Limited available assist at home upon discharge    [] Patient or family requests d/c to post-acute facility    [x] Poor cognition/safety awareness for d/c to home alone    [] Unable to maintain ordered weight bearing status    [] Patient with salient signs of long-standing immobility   [x] Decreased independence with ADLs   [x] Increased risk for falls   [] Other:    If pt is unable to be seen after this session, please let this note serve as discharge summary.  Please see case management note for discharge disposition.  Thank you.    Patient Diagnosis(es): The encounter diagnosis was Cerebrovascular accident (CVA), unspecified mechanism (HCC).     Assessment   Assessment: Co-tx collaboration this date to safely meet goals and will have better occupational performance outcomes with in a co-treatment than 1:1 session.  Pt tolerated session fair, BP stable throughout. Able to perform bed mobility w/ SBA, functional transfers w/ CGA and RW, and mobility w/ CGA and RW. She tolerated x10 reps AROM ex w/ BUE, increased time to complete motion w/ RUE /2

## 2025-05-09 NOTE — PLAN OF CARE
Problem: Discharge Planning  Goal: Discharge to home or other facility with appropriate resources  5/9/2025 1120 by Nichelle Hollins RN  Outcome: Progressing

## 2025-05-09 NOTE — DISCHARGE INSTR - COC
Continuity of Care Form    Patient Name: Nany Almanza   :  1950  MRN:  [id number]    Admit date:  2025  Discharge date:  2025    Code Status Order: Full Code   Advance Directives:     Admitting Physician:  Noah John MD  PCP: No primary care provider on file.    Discharging Nurse: Nichelle Hollins RN  Discharging Hospital Unit/Room#: 0540/0540-01  Discharging Unit Phone Number: [phone]    Emergency Contact:   Extended Emergency Contact Information  Primary Emergency Contact: Liya Almanza  Address: [address]  Home Phone: [phone]  Relation: Child  Secondary Emergency Contact: Thea Naranjo  Home Phone: [phone]  Relation: Child    Past Surgical History:  Past Surgical History:   Procedure Laterality Date    COLONOSCOPY      KIDNEY REMOVAL Right 2018    DAVINCI RIGHT NEPHRECTOMY                 OTHER SURGICAL HISTORY  late 80s    cone biopsy     TONSILLECTOMY         Immunization History:     There is no immunization history on file for this patient.    Active Problems:  Patient Active Problem List   Diagnosis Code    Right renal mass N28.89    HTN (hypertension) I10    Acute respiratory failure with hypoxia and hypercapnia ([facility]) J96.01, J96.02    Hypoxia R09.02    SIRS (systemic inflammatory response syndrome) ([facility]) R65.10    Hypertensive emergency I16.1    Mass of upper lobe of right lung R91.8    COPD (chronic obstructive pulmonary disease) ([facility]) J44.9    Acute diastolic heart failure ([facility]) I50.31    Smoking history Z87.891    NSTEMI (non-ST elevated myocardial infarction) ([facility]) I21.4    Elevated brain natriuretic peptide (BNP) level R79.89    Hypercarbia R06.89    Pulmonary HTN ([facility]) I27.20    Acute and chronic respiratory failure with hypoxia ([facility]) J96.21    Cerebrovascular accident (CVA) ([facility]) I63.9    History of stroke Z86.73       Isolation/Infection:   Isolation

## 2025-05-09 NOTE — DISCHARGE SUMMARY
Hospital Medicine Discharge Summary    Patient: Nany Almanza   : 1950     Hospital:  [facility]  Admit Date: 2025   Discharge Date: 2025    Disposition:  Home w/ C   Code status:  Full  Condition at Discharge: Stable  Primary Care Provider: No primary care provider on file.    Admitting Provider: Noah John MD  Discharge Provider: Kamlesh Recinos MD     Discharge Diagnoses:      Active Hospital Problems    Diagnosis     History of stroke [Z86.73]     Cerebrovascular accident (CVA) (HCC) [I63.9]        Presenting Admission History:        74 y.o. female who presented to [facility] with PMHx significant for COPD, hypertension and hyperlipidemia who presents with worsening aphasia as well as right-sided weakness.  Patient states that her right-sided weakness started approximately 1 week prior to presentation.  She did not tell anyone or seek medical care at that time.  Patient states that her speech is more difficult today.  She was brought in by her grandson after he went to visit and she was not able to answer his questions normally.  Patient also states that she has not taken her blood pressure medications for a number of days.  Patient's CT scan in the ER did show signs of CVA and her blood pressure was uncontrolled with a systolic of than 200..        Assessment/Plan:           CVA -present on CT scan.  Likely occurred approximately 1 week had a presentation. CTA ordered on 25(ntoed age indeterm infarcts to left parieto-occip lobe complex, left parietal lobe, medial rt occip lobe, <30% stenosis of prox bilat ICA, mild to mod stenosis of rt vert, prox left MCA M1 segment, severe of distal M1 segment)   MRI brain done, noted multiple cva areas on left side   Echo ordered on 25( ef 55%, mod lvh, nl wm,mod TR,  severe pulm htn,neg bubble study)   Neurology consulted and appreciated.   Rec DAPT for 90  days then mono therapy thereafter,

## 2025-05-09 NOTE — PLAN OF CARE
Using pillows for support, bed alarm activated for safety and daughter remains in the room, Patient plans to be discharged home tomorrow.     Problem: Skin/Tissue Integrity  Goal: Skin integrity remains intact  Description: 1.  Monitor for areas of redness and/or skin breakdown2.  Assess vascular access sites hourly3.  Every 4-6 hours minimum:  Change oxygen saturation probe site4.  Every 4-6 hours:  If on nasal continuous positive airway pressure, respiratory therapy assess nares and determine need for appliance change or resting period  5/8/2025 2355 by Joanna Reyes, RN  Outcome: Progressing  5/8/2025 1442 by Nichelle Hollins, RN  Outcome: Progressing     Problem: Safety - Adult  Goal: Free from fall injury  Outcome: Progressing     Problem: Discharge Planning  Goal: Discharge to home or other facility with appropriate resources  Outcome: Progressing

## 2025-05-09 NOTE — CARE COORDINATION
CASE MANAGEMENT DISCHARGE SUMMARY      Discharge to: Home w/ [facility] SN/PT/OT  And [employer] O2 tank    Precertification completed: N/A  Hospital Exemption Notification (HENS) completed: N/A    IMM given: (date) 5/9/25  Follow-Up copy of Important Message from Medicare (IMM2) has been explained to patient and/or designated healthcare decision maker (HCDM). Pt and/or HCDM aware that patient is permitted to stay an additional 4 hours prior to discharge to consider an appeal if they feel as though they are being discharged too soon. Patient may discharge as planned if chooses to do so.  Patient/HCDM voice no other concerns or questions regarding this process.       New Durable Medical Equipment ordered/agency: Pt owns    Transportation:    Family/car: personal      Confirmed discharge plan with:     Patient: yes     Family:  yes @ bedside         Facility/Agency, name:  [facility] faxed       RN, name: Nichelle    Note: Discharging nurse to complete [facility], reconcile [facility], and place final copy with patient's discharge packet. RN to ensure that written prescriptions for  Level II medications are sent with patient to the facility as per protocol.      Martina Ivan, RN

## 2025-05-09 NOTE — PROGRESS NOTES
Patient resting in bed, awake, alert. Daughter at bedside. Only request was tissues and the need and willingness to be going home.   Denies any additional needs. Bed alarm activated. Call light in reach.

## 2025-05-09 NOTE — CARE COORDINATION
Writer received call from Mari, patient has no O2 to go home, writer called Sari with [employer] she will take a tank to patient to return home.     Martina Ivan RN

## 2025-05-09 NOTE — PLAN OF CARE
Problem: Chronic Conditions and Co-morbidities  Goal: Patient's chronic conditions and co-morbidity symptoms are monitored and maintained or improved  5/9/2025 1121 by Nichelle Hollins RN  Outcome: Adequate for Discharge  5/8/2025 2355 by Joanna Reyes RN  Outcome: Progressing     Problem: Discharge Planning  Goal: Discharge to home or other facility with appropriate resources  5/9/2025 1121 by Nichelle Hollins RN  Outcome: Adequate for Discharge  5/9/2025 1120 by Nichelle Hollins RN  Outcome: Progressing  5/8/2025 2355 by Joanna Reyes RN  Outcome: Progressing     Problem: Skin/Tissue Integrity  Goal: Skin integrity remains intact  Description: 1.  Monitor for areas of redness and/or skin breakdown2.  Assess vascular access sites hourly3.  Every 4-6 hours minimum:  Change oxygen saturation probe site4.  Every 4-6 hours:  If on nasal continuous positive airway pressure, respiratory therapy assess nares and determine need for appliance change or resting period  5/9/2025 1121 by Nichelle Hollins RN  Outcome: Adequate for Discharge  5/8/2025 2355 by Joanna Reyes RN  Outcome: Progressing     Problem: Neurosensory - Adult  Goal: Achieves stable or improved neurological status  5/9/2025 1121 by Nichelle Hollins RN  Outcome: Adequate for Discharge  Flowsheets (Taken 5/9/2025 0845)  Achieves stable or improved neurological status: Assess for and report changes in neurological status  5/8/2025 2355 by Joanna Reyes RN  Outcome: Progressing  Goal: Achieves maximal functionality and self care  5/9/2025 1121 by Nichelle Hollins RN  Outcome: Adequate for Discharge  5/8/2025 2355 by Joanna Reyes RN  Outcome: Progressing  Goal: Absence of seizures  5/9/2025 1121 by Nichelle Hollins RN  Outcome: Adequate for Discharge  5/8/2025 2355 by Joanna Reyes RN  Outcome: Progressing  Goal: Remains free of injury related to seizures activity  5/9/2025 1121 by Nichelle Hollins RN  Outcome: Adequate for Discharge  5/8/2025 2355 by Joanna Reyes

## 2025-05-09 NOTE — DISCHARGE SUMMARY
Pt discharged home with daughter. O2 delivered and in place for discharge, 2 L. Prescriptions picked up from pharmacy and remainder sent to [facility]. All belongings sent with pt. Discharge instructions and medications explained to daughter and patient, pt denies any further questions at this time.

## 2025-05-09 NOTE — CONSULTS
Please see full consult note 5/5.  Electronically signed by CRISTIAN Betancourt CNP on 5/9/25 at 8:03 AM EDT

## 2025-06-12 LAB — ECHO BSA: 1.66 M2

## 2025-08-05 ENCOUNTER — OFFICE VISIT (OUTPATIENT)
Dept: FAMILY MEDICINE CLINIC | Age: 75
End: 2025-08-05
Payer: MEDICARE

## 2025-08-05 VITALS
SYSTOLIC BLOOD PRESSURE: 182 MMHG | BODY MASS INDEX: 20.89 KG/M2 | HEART RATE: 86 BPM | TEMPERATURE: 97.5 F | WEIGHT: 130 LBS | OXYGEN SATURATION: 89 % | HEIGHT: 66 IN | DIASTOLIC BLOOD PRESSURE: 104 MMHG

## 2025-08-05 DIAGNOSIS — Z76.89 ENCOUNTER TO ESTABLISH CARE: Primary | ICD-10-CM

## 2025-08-05 DIAGNOSIS — Z85.528 HX OF RENAL CELL CARCINOMA: ICD-10-CM

## 2025-08-05 DIAGNOSIS — J44.9 CHRONIC OBSTRUCTIVE PULMONARY DISEASE, UNSPECIFIED COPD TYPE (HCC): ICD-10-CM

## 2025-08-05 DIAGNOSIS — I10 PRIMARY HYPERTENSION: ICD-10-CM

## 2025-08-05 DIAGNOSIS — I27.20 PULMONARY HTN (HCC): ICD-10-CM

## 2025-08-05 DIAGNOSIS — I63.9 CEREBROVASCULAR ACCIDENT (CVA), UNSPECIFIED MECHANISM (HCC): ICD-10-CM

## 2025-08-05 PROCEDURE — 99204 OFFICE O/P NEW MOD 45 MIN: CPT

## 2025-08-05 PROCEDURE — 1159F MED LIST DOCD IN RCRD: CPT

## 2025-08-05 PROCEDURE — 1160F RVW MEDS BY RX/DR IN RCRD: CPT

## 2025-08-05 PROCEDURE — 3080F DIAST BP >= 90 MM HG: CPT

## 2025-08-05 PROCEDURE — 1124F ACP DISCUSS-NO DSCNMKR DOCD: CPT

## 2025-08-05 PROCEDURE — 36415 COLL VENOUS BLD VENIPUNCTURE: CPT

## 2025-08-05 PROCEDURE — 3077F SYST BP >= 140 MM HG: CPT

## 2025-08-05 RX ORDER — AMLODIPINE BESYLATE 5 MG/1
5 TABLET ORAL DAILY
Qty: 90 TABLET | Refills: 0 | Status: SHIPPED | OUTPATIENT
Start: 2025-08-05

## 2025-08-05 RX ORDER — ATORVASTATIN CALCIUM 40 MG/1
40 TABLET, FILM COATED ORAL NIGHTLY
Qty: 90 TABLET | Refills: 0 | Status: SHIPPED | OUTPATIENT
Start: 2025-08-05

## 2025-08-05 RX ORDER — BUDESONIDE AND FORMOTEROL FUMARATE DIHYDRATE 160; 4.5 UG/1; UG/1
2 AEROSOL RESPIRATORY (INHALATION) 2 TIMES DAILY
Qty: 30.6 G | Refills: 1 | Status: SHIPPED | OUTPATIENT
Start: 2025-08-05

## 2025-08-05 RX ORDER — CLOPIDOGREL BISULFATE 75 MG/1
75 TABLET ORAL DAILY
Qty: 90 TABLET | Refills: 0 | Status: SHIPPED | OUTPATIENT
Start: 2025-08-05 | End: 2025-11-03

## 2025-08-05 RX ORDER — ALBUTEROL SULFATE 90 UG/1
2 INHALANT RESPIRATORY (INHALATION) 4 TIMES DAILY PRN
Qty: 18 G | Refills: 0 | Status: SHIPPED | OUTPATIENT
Start: 2025-08-05

## 2025-08-05 RX ORDER — LOSARTAN POTASSIUM 50 MG/1
50 TABLET ORAL DAILY
Qty: 90 TABLET | Refills: 0 | Status: SHIPPED | OUTPATIENT
Start: 2025-08-05

## 2025-08-05 RX ORDER — METOPROLOL SUCCINATE 25 MG/1
25 TABLET, EXTENDED RELEASE ORAL 2 TIMES DAILY
Qty: 180 TABLET | Refills: 0 | Status: SHIPPED | OUTPATIENT
Start: 2025-08-05

## 2025-08-05 ASSESSMENT — PATIENT HEALTH QUESTIONNAIRE - PHQ9
SUM OF ALL RESPONSES TO PHQ QUESTIONS 1-9: 0
1. LITTLE INTEREST OR PLEASURE IN DOING THINGS: NOT AT ALL
SUM OF ALL RESPONSES TO PHQ QUESTIONS 1-9: 0
2. FEELING DOWN, DEPRESSED OR HOPELESS: NOT AT ALL

## 2025-08-05 ASSESSMENT — ENCOUNTER SYMPTOMS
ALLERGIC/IMMUNOLOGIC NEGATIVE: 1
SHORTNESS OF BREATH: 1
GASTROINTESTINAL NEGATIVE: 1
EYES NEGATIVE: 1

## 2025-08-06 LAB
ALBUMIN SERPL-MCNC: 3.9 G/DL (ref 3.4–5)
ALBUMIN/GLOB SERPL: 1.6 {RATIO} (ref 1.1–2.2)
ALP SERPL-CCNC: 90 U/L (ref 40–129)
ALT SERPL-CCNC: 8 U/L (ref 10–40)
ANION GAP SERPL CALCULATED.3IONS-SCNC: 8 MMOL/L (ref 3–16)
AST SERPL-CCNC: 16 U/L (ref 15–37)
BILIRUB SERPL-MCNC: 0.3 MG/DL (ref 0–1)
BUN SERPL-MCNC: 16 MG/DL (ref 7–20)
CALCIUM SERPL-MCNC: 9.7 MG/DL (ref 8.3–10.6)
CHLORIDE SERPL-SCNC: 103 MMOL/L (ref 99–110)
CO2 SERPL-SCNC: 33 MMOL/L (ref 21–32)
CREAT SERPL-MCNC: 1 MG/DL (ref 0.6–1.2)
GFR SERPLBLD CREATININE-BSD FMLA CKD-EPI: 58 ML/MIN/{1.73_M2}
GLUCOSE SERPL-MCNC: 103 MG/DL (ref 70–99)
POTASSIUM SERPL-SCNC: 5.6 MMOL/L (ref 3.5–5.1)
PROT SERPL-MCNC: 6.4 G/DL (ref 6.4–8.2)
SODIUM SERPL-SCNC: 144 MMOL/L (ref 136–145)

## 2025-08-19 ENCOUNTER — TELEPHONE (OUTPATIENT)
Dept: FAMILY MEDICINE CLINIC | Age: 75
End: 2025-08-19